# Patient Record
Sex: MALE | Race: WHITE | Employment: FULL TIME | URBAN - METROPOLITAN AREA
[De-identification: names, ages, dates, MRNs, and addresses within clinical notes are randomized per-mention and may not be internally consistent; named-entity substitution may affect disease eponyms.]

---

## 2017-07-25 PROBLEM — E66.9 OBESITY (BMI 30.0-34.9): Status: ACTIVE | Noted: 2017-07-25

## 2017-07-25 PROBLEM — Z72.0 TOBACCO USE: Status: ACTIVE | Noted: 2017-07-25

## 2017-07-25 PROBLEM — G89.29 CHRONIC RIGHT SHOULDER PAIN: Status: ACTIVE | Noted: 2017-07-25

## 2017-07-25 PROBLEM — L82.1 SK (SEBORRHEIC KERATOSIS): Status: ACTIVE | Noted: 2017-07-25

## 2017-07-25 PROBLEM — M25.511 CHRONIC RIGHT SHOULDER PAIN: Status: ACTIVE | Noted: 2017-07-25

## 2018-07-21 PROBLEM — E29.1 MALE HYPOGONADISM: Status: ACTIVE | Noted: 2018-07-21

## 2018-07-23 PROBLEM — R79.82 ELEVATED C-REACTIVE PROTEIN (CRP): Status: ACTIVE | Noted: 2018-07-23

## 2021-05-25 PROBLEM — Z13.220 SCREENING FOR LIPID DISORDERS: Chronic | Status: ACTIVE | Noted: 2021-05-25

## 2021-05-25 PROBLEM — Z00.00 WELL ADULT EXAM: Status: ACTIVE | Noted: 2021-05-25

## 2021-05-25 PROBLEM — R06.83 LOUD SNORING: Status: ACTIVE | Noted: 2021-05-25

## 2021-05-25 PROBLEM — G89.29 CHRONIC LEFT-SIDED LOW BACK PAIN WITH LEFT-SIDED SCIATICA: Status: ACTIVE | Noted: 2021-05-25

## 2021-05-25 PROBLEM — Z13.1 SCREENING FOR DIABETES MELLITUS: Chronic | Status: ACTIVE | Noted: 2021-05-25

## 2021-05-25 PROBLEM — Z00.00 WELL ADULT EXAM: Chronic | Status: ACTIVE | Noted: 2021-05-25

## 2021-05-25 PROBLEM — Z13.1 SCREENING FOR DIABETES MELLITUS: Status: ACTIVE | Noted: 2021-05-25

## 2021-05-25 PROBLEM — G89.29 CHRONIC PAIN OF LEFT KNEE: Status: ACTIVE | Noted: 2021-05-25

## 2021-05-25 PROBLEM — M25.552 CHRONIC LEFT HIP PAIN: Status: ACTIVE | Noted: 2021-05-25

## 2021-05-25 PROBLEM — Z02.89 ENCOUNTER FOR PHYSICAL EXAMINATION RELATED TO EMPLOYMENT: Status: ACTIVE | Noted: 2021-05-25

## 2021-05-25 PROBLEM — E78.1 HYPERTRIGLYCERIDEMIA: Status: RESOLVED | Noted: 2021-05-25 | Resolved: 2021-05-25

## 2021-05-25 PROBLEM — G89.29 CHRONIC LEFT HIP PAIN: Status: ACTIVE | Noted: 2021-05-25

## 2021-05-25 PROBLEM — M54.42 CHRONIC LEFT-SIDED LOW BACK PAIN WITH LEFT-SIDED SCIATICA: Status: ACTIVE | Noted: 2021-05-25

## 2021-05-25 PROBLEM — E78.1 HYPERTRIGLYCERIDEMIA: Status: ACTIVE | Noted: 2021-05-25

## 2021-05-25 PROBLEM — E66.09 CLASS 1 OBESITY DUE TO EXCESS CALORIES WITHOUT SERIOUS COMORBIDITY WITH BODY MASS INDEX (BMI) OF 33.0 TO 33.9 IN ADULT: Status: ACTIVE | Noted: 2017-07-25

## 2021-05-25 PROBLEM — Z13.220 SCREENING FOR LIPID DISORDERS: Status: ACTIVE | Noted: 2021-05-25

## 2021-05-25 PROBLEM — M25.562 CHRONIC PAIN OF LEFT KNEE: Status: ACTIVE | Noted: 2021-05-25

## 2021-05-28 ENCOUNTER — HOSPITAL ENCOUNTER (OUTPATIENT)
Dept: GENERAL RADIOLOGY | Age: 51
Discharge: HOME | End: 2021-05-28
Payer: COMMERCIAL

## 2021-05-28 DIAGNOSIS — G89.29 CHRONIC LEFT HIP PAIN: ICD-10-CM

## 2021-05-28 DIAGNOSIS — G89.29 CHRONIC PAIN OF LEFT KNEE: ICD-10-CM

## 2021-05-28 DIAGNOSIS — M54.42 CHRONIC LEFT-SIDED LOW BACK PAIN WITH LEFT-SIDED SCIATICA: ICD-10-CM

## 2021-05-28 DIAGNOSIS — G89.29 CHRONIC LEFT-SIDED LOW BACK PAIN WITH LEFT-SIDED SCIATICA: ICD-10-CM

## 2021-05-28 DIAGNOSIS — M25.552 CHRONIC LEFT HIP PAIN: ICD-10-CM

## 2021-05-28 DIAGNOSIS — M25.562 CHRONIC PAIN OF LEFT KNEE: ICD-10-CM

## 2021-05-28 PROCEDURE — 73502 X-RAY EXAM HIP UNI 2-3 VIEWS: CPT

## 2021-05-28 PROCEDURE — 72100 X-RAY EXAM L-S SPINE 2/3 VWS: CPT

## 2021-05-28 PROCEDURE — 73562 X-RAY EXAM OF KNEE 3: CPT

## 2021-06-09 PROBLEM — Z51.81 ENCOUNTER FOR MONITORING TESTOSTERONE REPLACEMENT THERAPY: Status: ACTIVE | Noted: 2021-06-09

## 2021-06-09 PROBLEM — Z79.890 ENCOUNTER FOR MONITORING TESTOSTERONE REPLACEMENT THERAPY: Status: ACTIVE | Noted: 2021-06-09

## 2021-06-09 PROBLEM — R68.82 DIMINISHED LIBIDO: Status: ACTIVE | Noted: 2021-06-09

## 2021-06-09 PROBLEM — R53.83 LACK OF ENERGY: Status: ACTIVE | Noted: 2021-06-09

## 2021-06-09 PROBLEM — Z79.899 HIGH RISK MEDICATION USE: Status: ACTIVE | Noted: 2021-06-09

## 2021-06-20 PROBLEM — Z79.890 LONG-TERM CURRENT USE OF TESTOSTERONE REPLACEMENT THERAPY: Status: ACTIVE | Noted: 2021-06-20

## 2021-07-27 ENCOUNTER — HOSPITAL ENCOUNTER (OUTPATIENT)
Dept: SURGERY | Age: 51
Discharge: HOME | End: 2021-07-27

## 2021-07-27 VITALS — HEIGHT: 65 IN | WEIGHT: 194 LBS | BODY MASS INDEX: 32.32 KG/M2

## 2021-07-27 NOTE — PERIOP NOTES
Patient verified name, , and procedure. Type: 1a; abbreviated assessment per anesthesia guidelines  Labs per surgeon: none. Labs per anesthesia: none. Instructed pt that they will be notified via the hospital for time of arrival to GI lab. If no call by 3 pm the day before call [phone]. Follow diet and prep instructions per Dr Dilcia Contreras instructions as follows: You will be on a clear liquid diet the day before your procedure and you may have any of the following clear liquids:   Water.  Strained fruit juices, without pulp, including apple, orange, white grape, or white cranberry.  Limeade or lemonade.  Coffee or tea (do not use any non-dairy creamer.)   Chicken broth.  Gelatin desserts, without added fruit or toppings (no red or purple gelatin.)  You should NOT:   Do NOT drink any milk products or use any milk products in coffee or tea.  Do NOT drink anything with red or purple dye.  Do NOT drink any alcoholic beverages. NPO after midnight        Bath or shower the night before and the am of surgery with non-moisturizing soap. No lotions, oils, powders, cologne on skin. No make up, eye make up or jewelry. Wear loose fitting comfortable, clean clothing. Must have adult present in building the entire time and MUST bring someone with you or arrange for someone to drive you home after the test.      You may take medications up to 3 hours prior to your procedure with sips of water only. Medications to take: none. Patient to hold: none. The following discharge instructions reviewed with patient: medication given during procedure may cause drowsiness for several hours, therefore, do not drive or operate machinery for remainder of the day, no alcohol on the day of your procedure, resume regular diet and activity unless otherwise directed, you may experience abdominal distention for several hours that is relieved by the passage of gas.  Contact your physician if you have any of the following: fever or chills, severe abdominal pain or excessive amount of bleeding or a large amount when having a bowel movement.  Occasional specks of blood with bowel movement would not be unusual.

## 2021-08-02 ENCOUNTER — ANESTHESIA EVENT (OUTPATIENT)
Dept: ENDOSCOPY | Age: 51
End: 2021-08-02
Payer: COMMERCIAL

## 2021-08-03 ENCOUNTER — HOSPITAL ENCOUNTER (OUTPATIENT)
Age: 51
Setting detail: OUTPATIENT SURGERY
Discharge: HOME | End: 2021-08-03
Attending: SURGERY | Admitting: SURGERY
Payer: COMMERCIAL

## 2021-08-03 ENCOUNTER — ANESTHESIA (OUTPATIENT)
Dept: ENDOSCOPY | Age: 51
End: 2021-08-03
Payer: COMMERCIAL

## 2021-08-03 VITALS
HEART RATE: 66 BPM | TEMPERATURE: 96.8 F | OXYGEN SATURATION: 97 % | SYSTOLIC BLOOD PRESSURE: 123 MMHG | BODY MASS INDEX: 32.45 KG/M2 | DIASTOLIC BLOOD PRESSURE: 74 MMHG | RESPIRATION RATE: 16 BRPM | WEIGHT: 195 LBS

## 2021-08-03 DIAGNOSIS — Z80.0 FAMILY HISTORY OF COLON CANCER: ICD-10-CM

## 2021-08-03 PROCEDURE — 76060000031 HC ANESTHESIA FIRST 0.5 HR: Performed by: SURGERY

## 2021-08-03 PROCEDURE — 74011250636 HC RX REV CODE- 250/636: Performed by: NURSE ANESTHETIST, CERTIFIED REGISTERED

## 2021-08-03 PROCEDURE — 76040000025: Performed by: SURGERY

## 2021-08-03 PROCEDURE — 74011000250 HC RX REV CODE- 250: Performed by: NURSE ANESTHETIST, CERTIFIED REGISTERED

## 2021-08-03 PROCEDURE — 45378 DIAGNOSTIC COLONOSCOPY: CPT | Performed by: SURGERY

## 2021-08-03 PROCEDURE — 74011000250 HC RX REV CODE- 250: Performed by: ANESTHESIOLOGY

## 2021-08-03 PROCEDURE — 2709999900 HC NON-CHARGEABLE SUPPLY: Performed by: SURGERY

## 2021-08-03 PROCEDURE — 74011250636 HC RX REV CODE- 250/636: Performed by: ANESTHESIOLOGY

## 2021-08-03 RX ORDER — OXYCODONE HYDROCHLORIDE 5 MG/1
10 TABLET ORAL
Status: DISCONTINUED | OUTPATIENT
Start: 2021-08-03 | End: 2021-08-03 | Stop reason: HOSPADM

## 2021-08-03 RX ORDER — LIDOCAINE HYDROCHLORIDE 20 MG/ML
INJECTION, SOLUTION EPIDURAL; INFILTRATION; INTRACAUDAL; PERINEURAL AS NEEDED
Status: DISCONTINUED | OUTPATIENT
Start: 2021-08-03 | End: 2021-08-03 | Stop reason: HOSPADM

## 2021-08-03 RX ORDER — MIDAZOLAM HYDROCHLORIDE 1 MG/ML
2 INJECTION, SOLUTION INTRAMUSCULAR; INTRAVENOUS
Status: DISCONTINUED | OUTPATIENT
Start: 2021-08-03 | End: 2021-08-03 | Stop reason: HOSPADM

## 2021-08-03 RX ORDER — PROPOFOL 10 MG/ML
INJECTION, EMULSION INTRAVENOUS AS NEEDED
Status: DISCONTINUED | OUTPATIENT
Start: 2021-08-03 | End: 2021-08-03 | Stop reason: HOSPADM

## 2021-08-03 RX ORDER — ONDANSETRON 2 MG/ML
4 INJECTION INTRAMUSCULAR; INTRAVENOUS ONCE
Status: DISCONTINUED | OUTPATIENT
Start: 2021-08-03 | End: 2021-08-03 | Stop reason: HOSPADM

## 2021-08-03 RX ORDER — SODIUM CHLORIDE, SODIUM LACTATE, POTASSIUM CHLORIDE, CALCIUM CHLORIDE 600; 310; 30; 20 MG/100ML; MG/100ML; MG/100ML; MG/100ML
100 INJECTION, SOLUTION INTRAVENOUS CONTINUOUS
Status: DISCONTINUED | OUTPATIENT
Start: 2021-08-03 | End: 2021-08-03 | Stop reason: HOSPADM

## 2021-08-03 RX ORDER — ALBUTEROL SULFATE 0.83 MG/ML
2.5 SOLUTION RESPIRATORY (INHALATION) AS NEEDED
Status: DISCONTINUED | OUTPATIENT
Start: 2021-08-03 | End: 2021-08-03 | Stop reason: HOSPADM

## 2021-08-03 RX ORDER — DIPHENHYDRAMINE HYDROCHLORIDE 50 MG/ML
12.5 INJECTION, SOLUTION INTRAMUSCULAR; INTRAVENOUS
Status: DISCONTINUED | OUTPATIENT
Start: 2021-08-03 | End: 2021-08-03 | Stop reason: HOSPADM

## 2021-08-03 RX ORDER — SODIUM CHLORIDE, SODIUM LACTATE, POTASSIUM CHLORIDE, CALCIUM CHLORIDE 600; 310; 30; 20 MG/100ML; MG/100ML; MG/100ML; MG/100ML
INJECTION, SOLUTION INTRAVENOUS
Status: DISCONTINUED | OUTPATIENT
Start: 2021-08-03 | End: 2021-08-03 | Stop reason: HOSPADM

## 2021-08-03 RX ORDER — OXYCODONE HYDROCHLORIDE 5 MG/1
5 TABLET ORAL
Status: DISCONTINUED | OUTPATIENT
Start: 2021-08-03 | End: 2021-08-03 | Stop reason: HOSPADM

## 2021-08-03 RX ORDER — MIDAZOLAM HYDROCHLORIDE 1 MG/ML
2 INJECTION, SOLUTION INTRAMUSCULAR; INTRAVENOUS ONCE
Status: DISCONTINUED | OUTPATIENT
Start: 2021-08-03 | End: 2021-08-03 | Stop reason: HOSPADM

## 2021-08-03 RX ORDER — NALOXONE HYDROCHLORIDE 0.4 MG/ML
0.1 INJECTION, SOLUTION INTRAMUSCULAR; INTRAVENOUS; SUBCUTANEOUS AS NEEDED
Status: DISCONTINUED | OUTPATIENT
Start: 2021-08-03 | End: 2021-08-03 | Stop reason: HOSPADM

## 2021-08-03 RX ORDER — LIDOCAINE HYDROCHLORIDE 10 MG/ML
0.1 INJECTION INFILTRATION; PERINEURAL AS NEEDED
Status: DISCONTINUED | OUTPATIENT
Start: 2021-08-03 | End: 2021-08-03 | Stop reason: HOSPADM

## 2021-08-03 RX ORDER — FENTANYL CITRATE 50 UG/ML
100 INJECTION, SOLUTION INTRAMUSCULAR; INTRAVENOUS ONCE
Status: DISCONTINUED | OUTPATIENT
Start: 2021-08-03 | End: 2021-08-03 | Stop reason: HOSPADM

## 2021-08-03 RX ORDER — HYDROMORPHONE HYDROCHLORIDE 2 MG/ML
0.5 INJECTION, SOLUTION INTRAMUSCULAR; INTRAVENOUS; SUBCUTANEOUS
Status: DISCONTINUED | OUTPATIENT
Start: 2021-08-03 | End: 2021-08-03 | Stop reason: HOSPADM

## 2021-08-03 RX ADMIN — SODIUM CHLORIDE, SODIUM LACTATE, POTASSIUM CHLORIDE, AND CALCIUM CHLORIDE 100 ML/HR: 600; 310; 30; 20 INJECTION, SOLUTION INTRAVENOUS at 08:40

## 2021-08-03 RX ADMIN — SODIUM CHLORIDE, SODIUM LACTATE, POTASSIUM CHLORIDE, AND CALCIUM CHLORIDE: 600; 310; 30; 20 INJECTION, SOLUTION INTRAVENOUS at 10:11

## 2021-08-03 RX ADMIN — LIDOCAINE HYDROCHLORIDE 30 MG: 20 INJECTION, SOLUTION EPIDURAL; INFILTRATION; INTRACAUDAL; PERINEURAL at 10:17

## 2021-08-03 RX ADMIN — PROPOFOL 50 MG: 10 INJECTION, EMULSION INTRAVENOUS at 10:18

## 2021-08-03 RX ADMIN — PROPOFOL 300 MCG/KG/MIN: 10 INJECTION, EMULSION INTRAVENOUS at 10:19

## 2021-08-03 RX ADMIN — LIDOCAINE HYDROCHLORIDE 0.1 ML: 10 INJECTION, SOLUTION INFILTRATION; PERINEURAL at 08:41

## 2021-08-03 NOTE — PROCEDURES
Procedure in Detail:  Informed consent was obtained for the procedure. The patient was placed in the left lateral decubitus position and sedation was induced by anesthesia. The scope was inserted into the rectum and advanced under direct vision to the cecum, which was identified by the ileocecal valve and appendiceal orifice. The quality of the colonic preparation was excellent. A careful inspection was made as the colonoscope was withdrawn, including a retroflexed view of the rectum; findings and interventions are described below. Appropriate photodocumentation was obtained. Findings:   ANUS: Anal exam reveals no masses or hemorrhoids, sphincter tone is normal.   RECTUM: Rectal exam reveals no masses or hemorrhoids. SIGMOID COLON: The mucosa is normal with good vascular pattern and without ulcers, diverticula, and polyps. DESCENDING COLON: The mucosa is normal with good vascular pattern and without ulcers, diverticula, and polyps. SPLENIC FLEXURE: The splenic flexure is normal.   TRANSVERSE COLON: The mucosa is normal with good vascular pattern and without ulcers, diverticula, and polyps. HEPATIC FLEXURE: The hepatic flexure is normal.   ASCENDING COLON: The mucosa is normal with good vascular pattern and without ulcers, diverticula, and polyps. CECUM: The appendiceal orifice appears normal. The ileocecal valve appears normal.   TERMINAL ILEUM: The terminal ileum was not entered. Specimens: No specimens were collected. Complications: None; patient tolerated the procedure well. \    EBL - minimal    Recommendations:   - Repeat colonoscopy in 5 years.      Signed By: Mary Maier DO                        August 3, 2021

## 2021-08-03 NOTE — DISCHARGE INSTRUCTIONS
Instructions following colonoscopy:    NEXT COLONOSCOPY IN 5 YEARS    ACTIVITY:   Resume usual, basic activities around the house today.  You may be light-headed or sleepy from anesthesia, so be careful going up and down stairs.  Avoid driving, operating machinery, making important business decisions, or signing documents for 24 hours.  No alcohol for 24 hours.  If you have not urinated 8 hours after discharge, please call MD.    DIET:   No restriction. Greasy and spicy foods may cause upset after sedation. Please note, some people may have nausea or cramps after this procedure which can result in an upset stomach after eating.  Many people have loose stools or diarrhea immediately after colonoscopy. It is also not uncommon to not have a bowel movement for 2-3 days.  Air may have been put into your belly for the procedure. This may cause abdominal distention and gas pain. It is important to lay on your left side or ambulate to expel gas to relieve discomfort. PAIN:   Some cramping or gas pain is normal after colonoscopy. However, if you experience worsening pain over the course of the day, or pain with associated fever please call the office immediately      [address] IF:   You have a temperature higher than 101.5° Fahrenheit for more than 6 hours.  You have severe nausea or vomiting not relieved by medication; or diarrhea. Continue home medications as previously prescribed. MEDICATION INTERACTION:  During your procedure you potentially received a medication or medications which may reduce the effectiveness of oral contraceptives. Please consider other forms of contraception for 1 month following your procedure if you are currently using oral contraceptives as your primary form of birth control.  In addition to this, we recommend continuing your oral contraceptive as prescribed, unless otherwise instructed by your physician, during this time    After general anesthesia or intravenous sedation, for 24 hours or while taking prescription Narcotics:  · Limit your activities  · A responsible adult needs to be with you for the next 24 hours  · Do not drive and operate hazardous machinery  · Do not make important personal or business decisions  · Do not drink alcoholic beverages  · If you have not urinated within 8 hours after discharge, and you are experiencing discomfort from urinary retention, please go to the nearest ED. · If you have sleep apnea and have a CPAP machine, please use it for all naps and sleeping. · Please use caution when taking narcotics and any of your home medications that may cause drowsiness. *  Please give a list of your current medications to your Primary Care Provider. *  Please update this list whenever your medications are discontinued, doses are      changed, or new medications (including over-the-counter products) are added. *  Please carry medication information at all times in case of emergency situations. These are general instructions for a healthy lifestyle:  No smoking/ No tobacco products/ Avoid exposure to second hand smoke  Surgeon General's Warning:  Quitting smoking now greatly reduces serious risk to your health. Obesity, smoking, and sedentary lifestyle greatly increases your risk for illness  A healthy diet, regular physical exercise & weight monitoring are important for maintaining a healthy lifestyle    You may be retaining fluid if you have a history of heart failure or if you experience any of the following symptoms:  Weight gain of 3 pounds or more overnight or 5 pounds in a week, increased swelling in our hands or feet or shortness of breath while lying flat in bed. Please call your doctor as soon as you notice any of these symptoms; do not wait until your next office visit.

## 2021-08-03 NOTE — ANESTHESIA POSTPROCEDURE EVALUATION
Procedure(s):  COLONOSCOPY/BMI 33.     total IV anesthesia    Anesthesia Post Evaluation      Multimodal analgesia: multimodal analgesia used between 6 hours prior to anesthesia start to PACU discharge  Patient location during evaluation: PACU  Patient participation: complete - patient participated  Level of consciousness: awake and awake and alert  Pain management: adequate  Airway patency: patent  Anesthetic complications: no  Cardiovascular status: acceptable  Respiratory status: acceptable  Hydration status: acceptable  Post anesthesia nausea and vomiting:  controlled      INITIAL Post-op Vital signs:   Vitals Value Taken Time   /58 08/03/21 1050   Temp 36 °C (96.8 °F) 08/03/21 1040   Pulse 65 08/03/21 1050   Resp 16 08/03/21 1050   SpO2 98 % 08/03/21 1050

## 2021-08-03 NOTE — ANESTHESIA PREPROCEDURE EVALUATION
Relevant Problems   ENDOCRINE   (+) Class 1 obesity due to excess calories without serious comorbidity with body mass index (BMI) of 33.0 to 33.9 in adult       Anesthetic History   No history of anesthetic complications            Review of Systems / Medical History  Patient summary reviewed, nursing notes reviewed and pertinent labs reviewed    Pulmonary  Within defined limits                 Neuro/Psych   Within defined limits           Cardiovascular  Within defined limits                Exercise tolerance: >4 METS     GI/Hepatic/Renal  Within defined limits              Endo/Other             Other Findings              Physical Exam    Airway  Mallampati: II  TM Distance: 4 - 6 cm  Neck ROM: normal range of motion   Mouth opening: Normal     Cardiovascular  Regular rate and rhythm,  S1 and S2 normal,  no murmur, click, rub, or gallop             Dental  No notable dental hx       Pulmonary  Breath sounds clear to auscultation               Abdominal         Other Findings            Anesthetic Plan    ASA: 2  Anesthesia type: total IV anesthesia          Induction: Intravenous  Anesthetic plan and risks discussed with: Patient Bladder non-tender and non-distended. Urine clear yellow.

## 2021-08-03 NOTE — H&P
Lisa Valdovinos    8/3/2021    Date of Admission:  8/3/2021      Subjective:     Patient is a 44 y.o.  male presents for screening colonoscopy. The patient reports no problems. The patient has family history of colon cancer. The patient has never had a colonoscopy in the past. Otherwise there is no reported rectal bleeding or melena. No changes in appetite or unusual wt loss. No abdominal pain or bloating. No reported changes in bowel habits. Patient Active Problem List    Diagnosis Date Noted    Family history of colon cancer 2021    Long-term current use of testosterone replacement therapy 2021    Diminished libido 2021    Lack of energy 2021    Encounter for monitoring testosterone replacement therapy 2021    High risk medication use 2021    Well adult exam 2021    Encounter for physical examination related to employment 2021    Chronic left-sided low back pain with left-sided sciatica 2021    Chronic left hip pain 2021    Chronic pain of left knee 2021    Loud snoring 2021    Screening for diabetes mellitus 2021    Screening for lipid disorders 2021    Elevated C-reactive protein (CRP) 2018    Male hypogonadism 2018    Chews tobacco regularly 2017    Class 1 obesity due to excess calories without serious comorbidity with body mass index (BMI) of 33.0 to 33.9 in adult 2017    Chronic right shoulder pain 2017     Past Medical History:   Diagnosis Date    Chronic right shoulder pain     Hypertriglyceridemia 2021    no meds     Obesity (BMI 30.0-34. 9)       Past Surgical History:   Procedure Laterality Date    HX WISDOM TEETH EXTRACTION        Prior to Admission Medications   Prescriptions Last Dose Informant Patient Reported? Taking?    testosterone 12.5 mg/ 1.25 gram (1 %) glpm 7/3/2021 at Unknown time  No Yes   Si Actuation(s) by TransDERmal route daily. Max Daily Amount: 2 Actuation(s). Apply to skin of anterior shoulders/upper chest, 1 application each side. Facility-Administered Medications: None     No Known Allergies   Social History     Tobacco Use    Smoking status: Never Smoker    Smokeless tobacco: Current User    Tobacco comment: dip   Substance Use Topics    Alcohol use: Yes     Comment: 6-7 drinks per week       Social History     Social History Narrative    Not on file     Family History   Problem Relation Age of Onset    COPD Mother     Diabetes Father     Cancer Father         prostate        Current Facility-Administered Medications   Medication Dose Route Frequency    lidocaine (XYLOCAINE) 10 mg/mL (1 %) injection 0.1 mL  0.1 mL SubCUTAneous PRN    lactated Ringers infusion  100 mL/hr IntraVENous CONTINUOUS    fentaNYL citrate (PF) injection 100 mcg  100 mcg IntraVENous ONCE    midazolam (VERSED) injection 2 mg  2 mg IntraVENous ONCE PRN    midazolam (VERSED) injection 2 mg  2 mg IntraVENous ONCE       Review of Systems  A comprehensive review of systems was negative except for that written in the HPI.     Objective:     Vitals:    08/03/21 0813   BP: (!) 153/92   Pulse: (!) 58   Resp: 18   Temp: 97.2 °F (36.2 °C)   SpO2: 100%   Weight: 195 lb (88.5 kg)     PHYSICAL EXAM   Physical Examination: General appearance - alert, well appearing, and in no distress  Mental status - alert, oriented to person, place, and time  Eyes - pupils equal and reactive, extraocular eye movements intact  Nose - normal and patent, no erythema, discharge or polyps  Neck - supple, no significant adenopathy  Chest - clear to auscultation, no wheezes, rales or rhonchi, symmetric air entry  Heart - normal rate, regular rhythm, normal S1, S2, no murmurs, rubs, clicks or gallops  Abdomen - soft, nontender, nondistended, no masses or organomegaly  Neurological - alert, oriented, normal speech, no focal findings or movement disorder noted  Musculoskeletal - no joint tenderness, deformity or swelling  Extremities - peripheral pulses normal, no pedal edema, no clubbing or cyanosis  Skin - normal coloration and turgor, no rashes, no suspicious skin lesions noted      No results for input(s): WBC, HGB, HCT, PLT, HGBEXT, HCTEXT, PLTEXT in the last 72 hours. No results for input(s): NA, K, CL, GLU, CO2, BUN, CREA, MG, PHOS, TROIQ, INR, BNPP, INREXT in the last 72 hours. No lab exists for component: TROIP  No results for input(s): PH, PCO2, PO2, HCO3 in the last 72 hours.     Assessment:     Hospital Problems  Date Reviewed: 7/30/2021        Codes Class Noted POA    * (Principal) Family history of colon cancer ICD-10-CM: Z80.0  ICD-9-CM: V16.0  8/3/2021 Unknown            Plan:   Screening colonoscopy        Lopez Davila,

## 2021-12-13 ENCOUNTER — HOSPITAL ENCOUNTER (OUTPATIENT)
Dept: SURGERY | Age: 51
Discharge: HOME | End: 2021-12-13
Payer: COMMERCIAL

## 2021-12-13 ENCOUNTER — HOSPITAL ENCOUNTER (OUTPATIENT)
Dept: REHABILITATION | Age: 51
Discharge: HOME | End: 2021-12-13
Payer: COMMERCIAL

## 2021-12-13 VITALS
WEIGHT: 195 LBS | TEMPERATURE: 98.8 F | BODY MASS INDEX: 32.49 KG/M2 | HEIGHT: 65 IN | RESPIRATION RATE: 18 BRPM | SYSTOLIC BLOOD PRESSURE: 145 MMHG | OXYGEN SATURATION: 98 % | DIASTOLIC BLOOD PRESSURE: 85 MMHG | HEART RATE: 64 BPM

## 2021-12-13 DIAGNOSIS — R06.83 SNORING: Primary | ICD-10-CM

## 2021-12-13 LAB
ALBUMIN SERPL-MCNC: 3.7 G/DL (ref 3.5–5)
ANION GAP SERPL CALC-SCNC: 6 MMOL/L (ref 7–16)
APTT PPP: 28 SEC (ref 24.1–35.1)
ATRIAL RATE: 66 BPM
BACTERIA SPEC CULT: ABNORMAL
BASOPHILS # BLD: 0 K/UL (ref 0–0.2)
BASOPHILS NFR BLD: 1 % (ref 0–2)
BUN SERPL-MCNC: 8 MG/DL (ref 6–23)
CALCIUM SERPL-MCNC: 8.9 MG/DL (ref 8.3–10.4)
CALCULATED P AXIS, ECG09: 16 DEGREES
CALCULATED R AXIS, ECG10: 74 DEGREES
CALCULATED T AXIS, ECG11: 34 DEGREES
CHLORIDE SERPL-SCNC: 105 MMOL/L (ref 98–107)
CO2 SERPL-SCNC: 27 MMOL/L (ref 21–32)
CREAT SERPL-MCNC: 0.81 MG/DL (ref 0.8–1.5)
DIAGNOSIS, 93000: NORMAL
DIFFERENTIAL METHOD BLD: ABNORMAL
EOSINOPHIL # BLD: 0.2 K/UL (ref 0–0.8)
EOSINOPHIL NFR BLD: 3 % (ref 0.5–7.8)
ERYTHROCYTE [DISTWIDTH] IN BLOOD BY AUTOMATED COUNT: 11.9 % (ref 11.9–14.6)
EST. AVERAGE GLUCOSE BLD GHB EST-MCNC: 111 MG/DL
GLUCOSE SERPL-MCNC: 104 MG/DL (ref 65–100)
HBA1C MFR BLD: 5.5 % (ref 4.2–6.3)
HCT VFR BLD AUTO: 44 % (ref 41.1–50.3)
HGB BLD-MCNC: 15 G/DL (ref 13.6–17.2)
IMM GRANULOCYTES # BLD AUTO: 0 K/UL (ref 0–0.5)
IMM GRANULOCYTES NFR BLD AUTO: 0 % (ref 0–5)
INR PPP: 1
LYMPHOCYTES # BLD: 1.9 K/UL (ref 0.5–4.6)
LYMPHOCYTES NFR BLD: 31 % (ref 13–44)
MCH RBC QN AUTO: 32.3 PG (ref 26.1–32.9)
MCHC RBC AUTO-ENTMCNC: 34.1 G/DL (ref 31.4–35)
MCV RBC AUTO: 94.8 FL (ref 79.6–97.8)
MONOCYTES # BLD: 0.6 K/UL (ref 0.1–1.3)
MONOCYTES NFR BLD: 10 % (ref 4–12)
NEUTS SEG # BLD: 3.4 K/UL (ref 1.7–8.2)
NEUTS SEG NFR BLD: 55 % (ref 43–78)
NRBC # BLD: 0 K/UL (ref 0–0.2)
P-R INTERVAL, ECG05: 148 MS
PLATELET # BLD AUTO: 326 K/UL (ref 150–450)
PMV BLD AUTO: 9.2 FL (ref 9.4–12.3)
POTASSIUM SERPL-SCNC: 3.7 MMOL/L (ref 3.5–5.1)
PROTHROMBIN TIME: 13.9 SEC (ref 12.6–14.5)
Q-T INTERVAL, ECG07: 420 MS
QRS DURATION, ECG06: 98 MS
QTC CALCULATION (BEZET), ECG08: 440 MS
RBC # BLD AUTO: 4.64 M/UL (ref 4.23–5.6)
SERVICE CMNT-IMP: ABNORMAL
SODIUM SERPL-SCNC: 138 MMOL/L (ref 136–145)
VENTRICULAR RATE, ECG03: 66 BPM
WBC # BLD AUTO: 6.1 K/UL (ref 4.3–11.1)

## 2021-12-13 PROCEDURE — 85610 PROTHROMBIN TIME: CPT

## 2021-12-13 PROCEDURE — 94760 N-INVAS EAR/PLS OXIMETRY 1: CPT

## 2021-12-13 PROCEDURE — 80048 BASIC METABOLIC PNL TOTAL CA: CPT

## 2021-12-13 PROCEDURE — 83036 HEMOGLOBIN GLYCOSYLATED A1C: CPT

## 2021-12-13 PROCEDURE — 80307 DRUG TEST PRSMV CHEM ANLYZR: CPT

## 2021-12-13 PROCEDURE — 93005 ELECTROCARDIOGRAM TRACING: CPT

## 2021-12-13 PROCEDURE — 82040 ASSAY OF SERUM ALBUMIN: CPT

## 2021-12-13 PROCEDURE — 85730 THROMBOPLASTIN TIME PARTIAL: CPT

## 2021-12-13 PROCEDURE — 36415 COLL VENOUS BLD VENIPUNCTURE: CPT

## 2021-12-13 PROCEDURE — 97161 PT EVAL LOW COMPLEX 20 MIN: CPT

## 2021-12-13 PROCEDURE — 85025 COMPLETE CBC W/AUTO DIFF WBC: CPT

## 2021-12-13 PROCEDURE — 87641 MR-STAPH DNA AMP PROBE: CPT

## 2021-12-13 PROCEDURE — 77030027138 HC INCENT SPIROMETER -A

## 2021-12-13 NOTE — PERIOP NOTES
PLEASE CONTINUE TAKING ALL PRESCRIPTION MEDICATIONS UP TO THE DAY OF SURGERY UNLESS OTHERWISE DIRECTED BELOW. DISCONTINUE all vitamins, herbals and supplements 21 days prior to surgery. DISCONTINUE Non-Steriodal Anti-Inflammatory (NSAIDS) such as Advil, Ibuprofen, and Aleve 5 days prior to surgery. Home Medications to HOLD      All vitamins, supplements, and herbals stop 21 days prior to surgery. All NSAIDs such as Advil, Aleve, Ibuprofen, Diclofenac, Naproxen, etc. Stop 5 days prior to surgery. Aspirin and Aspirin products stop 5 days prior to surgery. *IT IS OK TO TAKE TYLENOL*     Home Medications to take  the day of surgery   NONE        Comments   *The day before surgery, 1/4/22, take Acetaminophen (Tylenol) 1000mg in the morning and again at bedtime*   BRING: bottle of soap (Dynahex) and incentive spirometer to the hospital.           Please do not bring home medications with you on the day of surgery unless otherwise directed by your nurse. If you are instructed to bring home medications, please give them to your nurse as they will be administered by the nursing staff. If you have any questions, please call [facility] [phone] or [facility] [phone]. Copy of above instructions given to patient.

## 2021-12-13 NOTE — PROGRESS NOTES
Cristian Oliva  : (17 y.o.) [facility] [place] [address].  [phone]       Physical Therapy Prehab Plan of Treatment and Evaluation Summary:2021    ICD-10: Treatment Diagnosis:   · Pain in left hip (M25.552)  · Stiffness of Left Hip, Not elsewhere classified (M25.652)  · Difficulty in walking, Not elsewhere classified (R26.2)  Precautions/Allergies:   Patient has no known allergies. MEDICAL/REFERRING DIAGNOSIS:  Unilateral primary osteoarthritis, left hip [M16.12]  REFERRING PHYSICIAN: Gely José MD  DATE OF SURGERY: 22    Assessment:   Comments:  Pt. Plans to go home with support from wife and kids. PROBLEM LIST (Impacting functional limitations):  Mr. Jamin Arana presents with the following left lower extremity(s) problems:  1. Strength  2. Range of Motion  3. Home Exercise Program  4. Pain   INTERVENTIONS PLANNED:  1. Home Exercise Program  2. Educational Discussion      TREATMENT PLAN: Effective Dates: 2021 TO 2021. Frequency/Duration: Patient to continue to perform home exercise program at least twice per day up until his surgery. GOALS: (Goals have been discussed and agreed upon with patient.)  Discharge Goals: Time Frame: 1 Day  1. Patient will demonstrate independence with a home exercise program designed to increase strength, range of motion and pain control to minimize functional deficits and optimize patient for total joint replacement. Rehabilitation Potential For Stated Goals: Good  Regarding Cristian Oliva therapy, I certify that the treatment plan above will be carried out by a therapist or under their direction.   Thank you for this referral,  Donnie Caldwell, PT               HISTORY:   Present Symptoms:  Pain Intensity 1:  (6 at worst)  Pain Location 1: Hip   History of Present Injury/Illness (Reason for Referral):  Medical/Referring Diagnosis: Unilateral primary osteoarthritis, left hip [M16.12]   Past Medical History/Comorbidities:   Mr. Tejas Baca  has a past medical history of Chronic right shoulder pain, Hypertriglyceridemia (05/25/2021), Obesity (BMI 30.0-34.9), and Smokeless tobacco use. Mr. Tejas Baca  has a past surgical history that includes hx wisdom teeth extraction and colonoscopy (N/A, 8/3/2021). Social History/Living Environment:   Home Environment: Private residence  # Steps to Enter: 1  Rails to Enter: No  One/Two Story Residence: One story  Living Alone: No  Support Systems: Spouse/Significant Other; Child(tucker)  Patient Expects to be Discharged to[de-identified] [employer]  Current DME Used/Available at Home: None  Tub or Shower Type: Tub/Shower combination    Work/Activity:  [employer]  Dominant Side:  RIGHT  Current Medications:  See Pre-assessment nursing note   Number of Personal Factors/Comorbidities that affect the Plan of Care: 1-2: MODERATE COMPLEXITY   EXAMINATION:   ADLs (Current Functional Status):   Ambulation:  [x] Independent  [] Walk Indoors Only  [] Walk Outdoors  [] Use Assistive Device  [] Use Wheelchair Only Dressing:  [x] [address] from Someone for:  [] Sock/Shoes  [] Pants  [] Everything   Bathing/Showering:   [x] Independent  [] Requires Assistance from Someone  [] [address] Only Household Activities:  [x] Routine house and yard work  [] Light Housework Only  [] None   Observation/Orthostatic Postural Assessment:       ROM/Flexibility:   Gross Assessment: Yes  AROM: Within functional limits (right LE)                LLE Assessment  LLE Assessment (WDL): Exception to WDL (left hip 3/5)  LLE AROM  L Hip Flexion: 100  L Hip ABduction: 20  L Knee Extension: 0          Strength:   Gross Assessment: Yes  Strength:  Within functional limits (right LE)                  Functional Mobility:    Gross Assessment: Yes    Gait Description (WDL): Exceptions to WDL  Stand to Sit: Independent  Sit to Stand: Independent  Distance (ft): 2000 Feet (ft)  Ambulation - Level of Assistance: Independent  Stance: Left decreased          Balance:    Sitting: Intact  Standing: Intact   Body Structures Involved:  1. Bones  2. Joints  3. Muscles  4. Ligaments Body Functions Affected:  1. Movement Related Activities and Participation Affected:  1. Mobility   Number of elements that affect the Plan of Care: 3: MODERATE COMPLEXITY   CLINICAL PRESENTATION:   Presentation: Stable and uncomplicated: LOW COMPLEXITY   CLINICAL DECISION MAKING:   Tool Used: Hip dysfunction and Osteoarthritis Outcome Score for Joint Replacement (HOOS, JR)  Score:  Initial: 6 (Interval: 70.426) 12/13/2021 Most Recent: TBD   Interpretation of Score: The HOOS, JR contains 6 items from the original HOOS survey. Items are coded from 0 to 4, none to extreme respectively. Lisa Bee is scored by summing the raw response (range 0-24) and then converting it to an interval score using the table provided below. The interval score ranges from 0 to 100 where 0 represents total hip disability and 100 represents perfect hip health. Medical Necessity:   · Mr. Stephan Brown is expected to optimize his lower extremity strength and ROM in preparation for joint replacement surgery. Reason for Services/Other Comments:  · Achieve baseline assesment of musculoskeletal system, functional mobility and home environment. , educate in PT HEP in preparation for surgery, educate in hospital plan of care. Use of outcome tool(s) and clinical judgement create a POC that gives a: Clear prediction of patient's progress: LOW COMPLEXITY   TREATMENT:   Treatment/Session Assessment:  Patient was instructed in PT- HEP to increase strength and ROM in LEs. Answered all questions. · Post session pain:  Hip and knee pain  · Compliance with Program/Exercises: compliant most of the time.   Total Treatment Duration:  PT Patient Time In/Time Out  Time In: 1100  Time Out: 10 Sharad Herbert, PT

## 2021-12-13 NOTE — ADVANCED PRACTICE NURSE
Total Joint Surgery Preoperative Chart Review      Patient ID:  Paxton Bruce  [id number]  31 y.o.  1970  Surgeon: Dr. Irene Cordova  Date of Surgery: 1/5/2022  Procedure: Total Left Hip Arthroplasty  Primary Care Physician: Brionna Topete [phone]  Specialty Physician(s):      Subjective:   Paxton Bruce is a 46 y.o. WHITE/NON- male who presents for preoperative evaluation for Total Left Hip arthroplasty. This is a preoperative chart review note based on data collected by the nurse at the surgical Pre-Assessment visit. Past Medical History:   Diagnosis Date    Chronic right shoulder pain     Hypertriglyceridemia 05/25/2021    no meds     Obesity (BMI 30.0-34. 9)     Smokeless tobacco use       Past Surgical History:   Procedure Laterality Date    COLONOSCOPY N/A 8/3/2021    COLONOSCOPY/BMI 35 performed by Agus Astorga DO at [facility] HX WISDOM TEETH EXTRACTION       Family History   Problem Relation Age of Onset    COPD Mother     Diabetes Father     Cancer Father         prostate      Social History     Tobacco Use    Smoking status: Never Smoker    Smokeless tobacco: Current User    Tobacco comment: dip   Substance Use Topics    Alcohol use: Yes     Comment: 2 drinks per day/14 per week        Prior to Admission medications    Not on File     No Known Allergies       Objective:     Physical Exam:   Patient Vitals for the past 24 hrs:   Temp Pulse Resp BP SpO2   12/13/21 1207 98.8 °F (37.1 °C) 64 18 (!) 145/85 98 %       ECG:    EKG Results     Procedure 720 Value Units Date/Time    EKG, 12 LEAD, INITIAL [[id number]] Collected: 12/13/21 1204    Order Status: Completed Updated: 12/13/21 1328     Ventricular Rate 66 BPM      Atrial Rate 66 BPM      P-R Interval 148 ms      QRS Duration 98 ms      Q-T Interval 420 ms      QTC Calculation (Bezet) 440 ms      Calculated P Axis 16 degrees      Calculated R Axis 74 degrees      Calculated T Axis 34 degrees Diagnosis --     Normal sinus rhythm  Normal ECG  No previous ECGs available  Confirmed by [facility]  MD ()RAY ([id number]) on 12/13/2021 1:28:21 PM            Data Review:   Labs:   Recent Results (from the past 24 hour(s))   CBC WITH AUTOMATED DIFF    Collection Time: 12/13/21 11:15 AM   Result Value Ref Range    WBC 6.1 4.3 - 11.1 K/uL    RBC 4.64 4.23 - 5.6 M/uL    HGB 15.0 13.6 - 17.2 g/dL    HCT 44.0 41.1 - 50.3 %    MCV 94.8 79.6 - 97.8 FL    MCH 32.3 26.1 - 32.9 PG    MCHC 34.1 31.4 - 35.0 g/dL    RDW 11.9 11.9 - 14.6 %    PLATELET 927 005 - 456 K/uL    MPV 9.2 (L) 9.4 - 12.3 FL    ABSOLUTE NRBC 0.00 0.0 - 0.2 K/uL    DF AUTOMATED      NEUTROPHILS 55 43 - 78 %    LYMPHOCYTES 31 13 - 44 %    MONOCYTES 10 4.0 - 12.0 %    EOSINOPHILS 3 0.5 - 7.8 %    BASOPHILS 1 0.0 - 2.0 %    IMMATURE GRANULOCYTES 0 0.0 - 5.0 %    ABS. NEUTROPHILS 3.4 1.7 - 8.2 K/UL    ABS. LYMPHOCYTES 1.9 0.5 - 4.6 K/UL    ABS. MONOCYTES 0.6 0.1 - 1.3 K/UL    ABS. EOSINOPHILS 0.2 0.0 - 0.8 K/UL    ABS. BASOPHILS 0.0 0.0 - 0.2 K/UL    ABS. IMM.  GRANS. 0.0 0.0 - 0.5 K/UL   HEMOGLOBIN A1C WITH EAG    Collection Time: 12/13/21 11:15 AM   Result Value Ref Range    Hemoglobin A1c 5.5 4.20 - 6.30 %    Est. average glucose 802 mg/dL   METABOLIC PANEL, BASIC    Collection Time: 12/13/21 11:15 AM   Result Value Ref Range    Sodium 138 136 - 145 mmol/L    Potassium 3.7 3.5 - 5.1 mmol/L    Chloride 105 98 - 107 mmol/L    CO2 27 21 - 32 mmol/L    Anion gap 6 (L) 7 - 16 mmol/L    Glucose 104 (H) 65 - 100 mg/dL    BUN 8 6 - 23 MG/DL    Creatinine 0.81 0.8 - 1.5 MG/DL    GFR est AA >60 >60 ml/min/1.73m2    GFR est non-AA >60 >60 ml/min/1.73m2    Calcium 8.9 8.3 - 10.4 MG/DL   PROTHROMBIN TIME + INR    Collection Time: 12/13/21 11:15 AM   Result Value Ref Range    Prothrombin time 13.9 12.6 - 14.5 sec    INR 1.0     PTT    Collection Time: 12/13/21 11:15 AM   Result Value Ref Range    aPTT 28.0 24.1 - 35.1 SEC   ALBUMIN    Collection Time: 12/13/21 11:15 AM   Result Value Ref Range    Albumin 3.7 3.5 - 5.0 g/dL   EKG, 12 LEAD, INITIAL    Collection Time: 12/13/21 12:04 PM   Result Value Ref Range    Ventricular Rate 66 BPM    Atrial Rate 66 BPM    P-R Interval 148 ms    QRS Duration 98 ms    Q-T Interval 420 ms    QTC Calculation (Bezet) 440 ms    Calculated P Axis 16 degrees    Calculated R Axis 74 degrees    Calculated T Axis 34 degrees    Diagnosis       Normal sinus rhythm  Normal ECG  No previous ECGs available  Confirmed by [facility]  MD ()RAY ([id number]) on 12/13/2021 1:28:21 PM           Problem List:  )  Patient Active Problem List   Diagnosis Code    Chews tobacco regularly Z72.0    Class 1 obesity due to excess calories without serious comorbidity with body mass index (BMI) of 33.0 to 33.9 in adult E66.09, Z68.33    Chronic right shoulder pain M25.511, G89.29    Male hypogonadism E29.1    Elevated C-reactive protein (CRP) R79.82    Well adult exam Z00.00    Encounter for physical examination related to employment Z02.89    Chronic left-sided low back pain with left-sided sciatica M54.42, G89.29    Chronic left hip pain M25.552, G89.29    Chronic pain of left knee M25.562, G89.29    Loud snoring R06.83    Screening for diabetes mellitus Z13.1    Screening for lipid disorders Z13.220    Diminished libido R68.82    Lack of energy R53.83    Encounter for monitoring testosterone replacement therapy Z51.81, Z79.890    High risk medication use Z79.899    Long-term current use of testosterone replacement therapy Z79.890    Family history of colon cancer Z80.0       Total Joint Surgery Pre-Assessment Recommendations:           Patient reports the symptoms of snoring, fatigue, observed apnea and /or excessive daytime sleepiness. Will refer patient for HST based on above assessment. Recommend continuous saturation monitoring hours of sleep, during hospitalization. Patient is excellent candidate for SDD.  I discussed with patient and he is acceptable to SDD. Will forward to Dr Julian Hermosillo.    Signed By: NANDO Levy    December 13, 2021

## 2021-12-13 NOTE — PERIOP NOTES
The below lab results are within anesthesia limits. Results routed via [facility] to patient's PCP per surgeon's request.     Recent Results (from the past 12 hour(s))   CBC WITH AUTOMATED DIFF    Collection Time: 12/13/21 11:15 AM   Result Value Ref Range    WBC 6.1 4.3 - 11.1 K/uL    RBC 4.64 4.23 - 5.6 M/uL    HGB 15.0 13.6 - 17.2 g/dL    HCT 44.0 41.1 - 50.3 %    MCV 94.8 79.6 - 97.8 FL    MCH 32.3 26.1 - 32.9 PG    MCHC 34.1 31.4 - 35.0 g/dL    RDW 11.9 11.9 - 14.6 %    PLATELET 630 757 - 272 K/uL    MPV 9.2 (L) 9.4 - 12.3 FL    ABSOLUTE NRBC 0.00 0.0 - 0.2 K/uL    DF AUTOMATED      NEUTROPHILS 55 43 - 78 %    LYMPHOCYTES 31 13 - 44 %    MONOCYTES 10 4.0 - 12.0 %    EOSINOPHILS 3 0.5 - 7.8 %    BASOPHILS 1 0.0 - 2.0 %    IMMATURE GRANULOCYTES 0 0.0 - 5.0 %    ABS. NEUTROPHILS 3.4 1.7 - 8.2 K/UL    ABS. LYMPHOCYTES 1.9 0.5 - 4.6 K/UL    ABS. MONOCYTES 0.6 0.1 - 1.3 K/UL    ABS. EOSINOPHILS 0.2 0.0 - 0.8 K/UL    ABS. BASOPHILS 0.0 0.0 - 0.2 K/UL    ABS. IMM.  GRANS. 0.0 0.0 - 0.5 K/UL   HEMOGLOBIN A1C WITH EAG    Collection Time: 12/13/21 11:15 AM   Result Value Ref Range    Hemoglobin A1c 5.5 4.20 - 6.30 %    Est. average glucose 701 mg/dL   METABOLIC PANEL, BASIC    Collection Time: 12/13/21 11:15 AM   Result Value Ref Range    Sodium 138 136 - 145 mmol/L    Potassium 3.7 3.5 - 5.1 mmol/L    Chloride 105 98 - 107 mmol/L    CO2 27 21 - 32 mmol/L    Anion gap 6 (L) 7 - 16 mmol/L    Glucose 104 (H) 65 - 100 mg/dL    BUN 8 6 - 23 MG/DL    Creatinine 0.81 0.8 - 1.5 MG/DL    GFR est AA >60 >60 ml/min/1.73m2    GFR est non-AA >60 >60 ml/min/1.73m2    Calcium 8.9 8.3 - 10.4 MG/DL   PROTHROMBIN TIME + INR    Collection Time: 12/13/21 11:15 AM   Result Value Ref Range    Prothrombin time 13.9 12.6 - 14.5 sec    INR 1.0     PTT    Collection Time: 12/13/21 11:15 AM   Result Value Ref Range    aPTT 28.0 24.1 - 35.1 SEC   ALBUMIN    Collection Time: 12/13/21 11:15 AM   Result Value Ref Range    Albumin 3.7 3.5 - 5.0 g/dL

## 2021-12-13 NOTE — PERIOP NOTES
Patient verified name and . Order for consent found in EHR and matches case posting; patient verified. Type 3 surgery, joint assessment complete. Labs per surgeon: CBC,BMP, PT/PTT, HgbA1c, Albumin, Nicotine/Cotinine; results pending. T&S DOS and POC glucose DOS; orders signed and held in EHR. Labs per anesthesia protocol: no additional  EKG: Completed today; results within anesthesia limits. Patient COVID test date 1/3/22 at . Thelma Rhodes [address]; Patient aware. The testing center [facility], [place]  and telephone number [phone] provided to the patient if not appointment found. MRSA/MSSA swab collected; pharmacy to review and dose antibiotic as appropriate. Hospital approved surgical skin cleanser and instructions to return bottle on DOS given per hospital policy. Patient provided with handouts including Guide to Surgery, Pain Management, Hand Hygiene, Blood Transfusion Education, and [place] [facility] Brochure. Patient answered medical/surgical history questions at their best of ability. All prior to admission medications documented in [facility]. Original medication prescription bottle NOT visualized during patient appointment. Patient instructed to hold all vitamins, supplements, herbals 3 weeks prior to surgery and NSAIDS/ASA 5 days prior to surgery. Patient teach back successful and patient demonstrates knowledge of instruction.

## 2021-12-13 NOTE — PROGRESS NOTES
12/13/21 1030   Oxygen Therapy   O2 Sat (%) 98 %   Pulse via Oximetry 72 beats per minute   O2 Device None (Room air)   Pre-Treatment   Breath Sounds Bilateral Clear   Pt's symptoms include:    Snoring  Tiredness- excessive daytime sleepiness  Esophageal disorder  Neck size      38        cm  Modified Louie stage 3-4  SACS Score 4  STOP BANG 4  [place] Sleepiness scale 11  Height    5  ' 5   \"   Weight  195   lbs  BMI 32.45    Sleepiness Scale:     Sitting and reading 2    Watching TV 2    Sitting inactive in a public place 0    As a passenger in a car for an hour without a break 1    Lying down to rest in the afternoon when circumstances Permits 3    Sitting and talking to someone 1    Sitting quietly after lunch without alcohol 2    In a car, while stopped for a few minutes 0    Total :  11      Refer patient for sleep study based on above assessment. Initial respiratory Assessment completed with pt. Pt was interviewed and evaluated in Joint camp prior to surgery. Patient ID:  Pollo Davis  [id number]  73 y.o.  1970  Surgeon: Dr. Marlys Sykes  Date of Surgery: 1/5/2022  Procedure: Total Left Hip Arthroplasty  Primary Care Physician: Kaya Brooke [place] [phone]  Specialists:     Pt taught proper COUGH technique  DIAPHRAGMATIC BREATHING EXERCISE INSTRUCTIONS GIVEN    History of smoking:   DENIES  Pt does chew tobacco                 Quit date:         Secondhand smoke:mother    Past procedures with Oxygen desaturation or delayed awakening:DENIES    Past Medical History:   Diagnosis Date    Chronic right shoulder pain     Hypertriglyceridemia 05/25/2021    no meds     Obesity (BMI 30.0-34. 9)     Smokeless tobacco use       HX OF COVID September- NO TEST, WIFE TEST POSITIVE AND PT HAD FEVER, WEAKNESS WITH VOMITING AND DIARRHEA FOR 1 WEEK  Respiratory history:DENIES SOB                                                                   Respiratory meds:  DENIES    FAMILY PRESENT: NO     PAST SLEEP STUDY:                          DENIES  HX OF KRIS:                                         DENIES  KRIS assessment:                                               SLEEPS ON SIDE       &      BACK          PHYSICAL EXAM   Body mass index is 32.45 kg/m². Visit Vitals  BP (!) 145/85 (BP 1 Location: Left upper arm, BP Patient Position: Sitting)   Pulse 64   Temp 98.8 °F (37.1 °C)   Resp 18   Ht 5' 5\" (1.651 m)   Wt 88.5 kg (195 lb)   SpO2 98%   BMI 32.45 kg/m²     Neck circumference:   38   cm    Loud snoring:                                                 YES            Witnessed apnea or wakening gasping or choking:        DENIES        Awakens with headaches:                                               DENIES  Morning or daytime tiredness/ sleepiness:                           TIRED  Dry mouth or sore throat in morning:                       DENIES                                   Louie stage:  3-4                                   SACS score:4  Stop Bang   STOP-BANG  Does the patient snore loudly (louder than talking or loud enough to be heard through closed doors)?: Yes  Does the patient often feel tired, fatigued, or sleepy during the daytime, even after a \"good\" night's sleep?: Yes  Has anyone ever observed the patient stop breathing during their sleep? : No  Does the patient have or are they being treated for high blood pressure?: No  Is the patient's BMI greater than 35?: No  Is your neck circumference greater than 17 inches (Male) or 16 inches (Female)?: No  Is the patient older than 48?: Yes  Is the patient male?: Yes  KRIS Score: 4  Has the patient been referred to Sleep Medicine?: Yes  Has the patient previously been diagnosed with Obstructive Sleep Apnea?: No                            CS HS  RESPIRATORY ASSESSMENT Q SHIFT   O2 PRN    ALBUTEROL  NEBULIZER Q6 PRN WHEEZING                                            Referrals:    [facility]  PT.  Phone [phone]

## 2021-12-16 LAB
COTININE UR QL SCN: POSITIVE NG/ML
DRUG SCREEN COMMENT:, 753798: ABNORMAL

## 2021-12-16 NOTE — PERIOP NOTES
Contains abnormal data NICOTINE/COTININE, UR, QT  Order: [id number]   Collected 12/13/2021 11:00     Status: Final result     Next appt: 01/03/2022 at 08:15 AM in Internal Medicine ([facility])     0 Result Notes     Ref Range & Units 12/13/21 1100 Resulting Agency   Cotinine Screen, urine Mqcwzz=506 ng/mL Positive Abnormal   [facility]   Drug Screen Comment:   Comment  [facility]   Comment: (NOTE)   This analysis is performed by immunoassay. Positive findings are   unconfirmed analytical test results; if results do not support   expected clinical finding, confirmation by an alternate methodology   is recommended. Patient metabolic variables, specific drug chemistry,   and specimen characteristics can affect test outcome. Technical consultation is available at [email or web address], or   call toll free [phone].    Performed At: [facility] & [address] [id number]   Alexandria Reyes MD SM:[id number]   Performed At: [facility]   [address] [id number]   Mayi Mcdonald PhD W[place]:[id number]               Specimen Collected: 12/13/21 11:00 Last Resulted: 12/16/21 09:36

## 2022-01-04 ENCOUNTER — ANESTHESIA EVENT (OUTPATIENT)
Dept: SURGERY | Age: 52
End: 2022-01-04
Payer: COMMERCIAL

## 2022-01-05 ENCOUNTER — HOME HEALTH ADMISSION (OUTPATIENT)
Dept: HOME HEALTH SERVICES | Facility: HOME HEALTH | Age: 52
End: 2022-01-05
Payer: COMMERCIAL

## 2022-01-05 ENCOUNTER — ANESTHESIA (OUTPATIENT)
Dept: SURGERY | Age: 52
End: 2022-01-05
Payer: COMMERCIAL

## 2022-01-05 ENCOUNTER — HOSPITAL ENCOUNTER (OUTPATIENT)
Age: 52
Discharge: HOME HEALTH | End: 2022-01-05
Attending: ORTHOPAEDIC SURGERY | Admitting: ORTHOPAEDIC SURGERY
Payer: COMMERCIAL

## 2022-01-05 ENCOUNTER — APPOINTMENT (OUTPATIENT)
Dept: GENERAL RADIOLOGY | Age: 52
End: 2022-01-05
Attending: ORTHOPAEDIC SURGERY
Payer: COMMERCIAL

## 2022-01-05 VITALS
OXYGEN SATURATION: 98 % | HEIGHT: 65 IN | SYSTOLIC BLOOD PRESSURE: 118 MMHG | TEMPERATURE: 97.6 F | DIASTOLIC BLOOD PRESSURE: 74 MMHG | RESPIRATION RATE: 17 BRPM | HEART RATE: 71 BPM | WEIGHT: 198 LBS | BODY MASS INDEX: 32.99 KG/M2

## 2022-01-05 PROBLEM — M16.12 OSTEOARTHRITIS OF LEFT HIP: Status: ACTIVE | Noted: 2022-01-05

## 2022-01-05 PROCEDURE — 77030034479 HC ADH SKN CLSR PRINEO J&J -B: Performed by: ORTHOPAEDIC SURGERY

## 2022-01-05 PROCEDURE — 72170 X-RAY EXAM OF PELVIS: CPT

## 2022-01-05 PROCEDURE — 77030040922 HC BLNKT HYPOTHRM STRY -A: Performed by: ANESTHESIOLOGY

## 2022-01-05 PROCEDURE — 76210000063 HC OR PH I REC FIRST 0.5 HR: Performed by: ORTHOPAEDIC SURGERY

## 2022-01-05 PROCEDURE — 97530 THERAPEUTIC ACTIVITIES: CPT

## 2022-01-05 PROCEDURE — 77030007880 HC KT SPN EPDRL BBMI -B: Performed by: ANESTHESIOLOGY

## 2022-01-05 PROCEDURE — 76060000035 HC ANESTHESIA 2 TO 2.5 HR: Performed by: ORTHOPAEDIC SURGERY

## 2022-01-05 PROCEDURE — 76010000171 HC OR TIME 2 TO 2.5 HR INTENSV-TIER 1: Performed by: ORTHOPAEDIC SURGERY

## 2022-01-05 PROCEDURE — 77030002933 HC SUT MCRYL J&J -A: Performed by: ORTHOPAEDIC SURGERY

## 2022-01-05 PROCEDURE — 77030033067 HC SUT PDO STRATFX SPIR J&J -B: Performed by: ORTHOPAEDIC SURGERY

## 2022-01-05 PROCEDURE — 77030003665 HC NDL SPN BBMI -A: Performed by: ANESTHESIOLOGY

## 2022-01-05 PROCEDURE — 74011250636 HC RX REV CODE- 250/636: Performed by: ORTHOPAEDIC SURGERY

## 2022-01-05 PROCEDURE — C1776 JOINT DEVICE (IMPLANTABLE): HCPCS | Performed by: ORTHOPAEDIC SURGERY

## 2022-01-05 PROCEDURE — 74011250636 HC RX REV CODE- 250/636: Performed by: ANESTHESIOLOGY

## 2022-01-05 PROCEDURE — 77030038692 HC WND DEB SYS IRMX -B: Performed by: ORTHOPAEDIC SURGERY

## 2022-01-05 PROCEDURE — 77030033138 HC SUT PGA STRATFX J&J -B: Performed by: ORTHOPAEDIC SURGERY

## 2022-01-05 PROCEDURE — 97165 OT EVAL LOW COMPLEX 30 MIN: CPT

## 2022-01-05 PROCEDURE — 74011000250 HC RX REV CODE- 250: Performed by: ANESTHESIOLOGY

## 2022-01-05 PROCEDURE — 74011250636 HC RX REV CODE- 250/636: Performed by: NURSE PRACTITIONER

## 2022-01-05 PROCEDURE — 77030018836 HC SOL IRR NACL ICUM -A: Performed by: ORTHOPAEDIC SURGERY

## 2022-01-05 PROCEDURE — C1713 ANCHOR/SCREW BN/BN,TIS/BN: HCPCS | Performed by: ORTHOPAEDIC SURGERY

## 2022-01-05 PROCEDURE — 74011250636 HC RX REV CODE- 250/636: Performed by: NURSE ANESTHETIST, CERTIFIED REGISTERED

## 2022-01-05 PROCEDURE — 74011000250 HC RX REV CODE- 250: Performed by: NURSE PRACTITIONER

## 2022-01-05 PROCEDURE — 77030002922 HC SUT FBRWRE ARTH -B: Performed by: ORTHOPAEDIC SURGERY

## 2022-01-05 PROCEDURE — 97161 PT EVAL LOW COMPLEX 20 MIN: CPT

## 2022-01-05 PROCEDURE — 27130 TOTAL HIP ARTHROPLASTY: CPT | Performed by: ORTHOPAEDIC SURGERY

## 2022-01-05 PROCEDURE — 74011250637 HC RX REV CODE- 250/637: Performed by: NURSE PRACTITIONER

## 2022-01-05 PROCEDURE — 74011250637 HC RX REV CODE- 250/637: Performed by: ANESTHESIOLOGY

## 2022-01-05 PROCEDURE — 74011000250 HC RX REV CODE- 250: Performed by: NURSE ANESTHETIST, CERTIFIED REGISTERED

## 2022-01-05 PROCEDURE — 97535 SELF CARE MNGMENT TRAINING: CPT

## 2022-01-05 PROCEDURE — 77030019557 HC ELECTRD VES SEAL MEDT -F: Performed by: ORTHOPAEDIC SURGERY

## 2022-01-05 PROCEDURE — 77030006835 HC BLD SAW SAG STRY -B: Performed by: ORTHOPAEDIC SURGERY

## 2022-01-05 PROCEDURE — 77030018673: Performed by: ORTHOPAEDIC SURGERY

## 2022-01-05 PROCEDURE — 2709999900 HC NON-CHARGEABLE SUPPLY: Performed by: ORTHOPAEDIC SURGERY

## 2022-01-05 DEVICE — 6.5MM LOW PROFILE HEX SCREW 35MM
Type: IMPLANTABLE DEVICE | Site: HIP | Status: FUNCTIONAL
Brand: TRIDENT II

## 2022-01-05 DEVICE — CERAMIC V40 FEMORAL HEAD
Type: IMPLANTABLE DEVICE | Site: HIP | Status: FUNCTIONAL
Brand: BIOLOX

## 2022-01-05 DEVICE — HIP H2 TOT ADV OTHER HD -- IMPL CAPPED H2: Type: IMPLANTABLE DEVICE | Status: FUNCTIONAL

## 2022-01-05 DEVICE — SHELL ACET CLUS H 52E TRTANIUM -- TRIDENT II: Type: IMPLANTABLE DEVICE | Site: HIP | Status: FUNCTIONAL

## 2022-01-05 DEVICE — 127 DEGREE NECK ANGLE HIP STEM
Type: IMPLANTABLE DEVICE | Site: HIP | Status: FUNCTIONAL
Brand: ACCOLADE

## 2022-01-05 DEVICE — TRIDENT X3 10 DEGREE POLYETHYLENE INSERT
Type: IMPLANTABLE DEVICE | Site: HIP | Status: FUNCTIONAL
Brand: TRIDENT X3 INSERT

## 2022-01-05 RX ORDER — ACETAMINOPHEN 500 MG
1000 TABLET ORAL ONCE
Status: DISCONTINUED | OUTPATIENT
Start: 2022-01-05 | End: 2022-01-05 | Stop reason: HOSPADM

## 2022-01-05 RX ORDER — SODIUM CHLORIDE 9 MG/ML
100 INJECTION, SOLUTION INTRAVENOUS CONTINUOUS
Status: DISCONTINUED | OUTPATIENT
Start: 2022-01-05 | End: 2022-01-05 | Stop reason: HOSPADM

## 2022-01-05 RX ORDER — CYCLOBENZAPRINE HCL 5 MG
5 TABLET ORAL
Status: DISCONTINUED | OUTPATIENT
Start: 2022-01-05 | End: 2022-01-05 | Stop reason: HOSPADM

## 2022-01-05 RX ORDER — TRANEXAMIC ACID 650 1/1
1300 TABLET ORAL
Status: COMPLETED | OUTPATIENT
Start: 2022-01-05 | End: 2022-01-05

## 2022-01-05 RX ORDER — ROPIVACAINE HYDROCHLORIDE 2 MG/ML
INJECTION, SOLUTION EPIDURAL; INFILTRATION; PERINEURAL AS NEEDED
Status: DISCONTINUED | OUTPATIENT
Start: 2022-01-05 | End: 2022-01-05 | Stop reason: HOSPADM

## 2022-01-05 RX ORDER — CELECOXIB 200 MG/1
200 CAPSULE ORAL ONCE
Status: COMPLETED | OUTPATIENT
Start: 2022-01-05 | End: 2022-01-05

## 2022-01-05 RX ORDER — CEFAZOLIN SODIUM/WATER 2 G/20 ML
2 SYRINGE (ML) INTRAVENOUS ONCE
Status: COMPLETED | OUTPATIENT
Start: 2022-01-05 | End: 2022-01-05

## 2022-01-05 RX ORDER — SODIUM CHLORIDE 0.9 % (FLUSH) 0.9 %
5-40 SYRINGE (ML) INJECTION EVERY 8 HOURS
Status: DISCONTINUED | OUTPATIENT
Start: 2022-01-05 | End: 2022-01-05 | Stop reason: HOSPADM

## 2022-01-05 RX ORDER — OXYCODONE HYDROCHLORIDE 5 MG/1
10 TABLET ORAL
Status: DISCONTINUED | OUTPATIENT
Start: 2022-01-05 | End: 2022-01-05 | Stop reason: HOSPADM

## 2022-01-05 RX ORDER — SODIUM CHLORIDE, SODIUM LACTATE, POTASSIUM CHLORIDE, CALCIUM CHLORIDE 600; 310; 30; 20 MG/100ML; MG/100ML; MG/100ML; MG/100ML
100 INJECTION, SOLUTION INTRAVENOUS CONTINUOUS
Status: DISCONTINUED | OUTPATIENT
Start: 2022-01-05 | End: 2022-01-05 | Stop reason: HOSPADM

## 2022-01-05 RX ORDER — HYDROMORPHONE HYDROCHLORIDE 2 MG/1
2 TABLET ORAL
Status: DISCONTINUED | OUTPATIENT
Start: 2022-01-05 | End: 2022-01-05

## 2022-01-05 RX ORDER — ACETAMINOPHEN 500 MG
1000 TABLET ORAL EVERY 6 HOURS
Status: DISCONTINUED | OUTPATIENT
Start: 2022-01-06 | End: 2022-01-05 | Stop reason: HOSPADM

## 2022-01-05 RX ORDER — ZOLPIDEM TARTRATE 5 MG/1
5 TABLET ORAL
Status: DISCONTINUED | OUTPATIENT
Start: 2022-01-05 | End: 2022-01-05 | Stop reason: HOSPADM

## 2022-01-05 RX ORDER — PROPOFOL 10 MG/ML
INJECTION, EMULSION INTRAVENOUS
Status: DISCONTINUED | OUTPATIENT
Start: 2022-01-05 | End: 2022-01-05 | Stop reason: HOSPADM

## 2022-01-05 RX ORDER — ASPIRIN 81 MG/1
81 TABLET ORAL EVERY 12 HOURS
Status: DISCONTINUED | OUTPATIENT
Start: 2022-01-05 | End: 2022-01-05 | Stop reason: HOSPADM

## 2022-01-05 RX ORDER — PANTOPRAZOLE SODIUM 40 MG/1
40 TABLET, DELAYED RELEASE ORAL
Status: DISCONTINUED | OUTPATIENT
Start: 2022-01-06 | End: 2022-01-05 | Stop reason: HOSPADM

## 2022-01-05 RX ORDER — HYDROMORPHONE HYDROCHLORIDE 1 MG/ML
1 INJECTION, SOLUTION INTRAMUSCULAR; INTRAVENOUS; SUBCUTANEOUS
Status: DISCONTINUED | OUTPATIENT
Start: 2022-01-05 | End: 2022-01-05 | Stop reason: HOSPADM

## 2022-01-05 RX ORDER — HYDROMORPHONE HYDROCHLORIDE 2 MG/1
2 TABLET ORAL
Status: DISCONTINUED | OUTPATIENT
Start: 2022-01-05 | End: 2022-01-05 | Stop reason: HOSPADM

## 2022-01-05 RX ORDER — ONDANSETRON 4 MG/1
4 TABLET, ORALLY DISINTEGRATING ORAL
Status: DISCONTINUED | OUTPATIENT
Start: 2022-01-05 | End: 2022-01-05 | Stop reason: HOSPADM

## 2022-01-05 RX ORDER — TRANEXAMIC ACID 100 MG/ML
INJECTION, SOLUTION INTRAVENOUS AS NEEDED
Status: DISCONTINUED | OUTPATIENT
Start: 2022-01-05 | End: 2022-01-05 | Stop reason: HOSPADM

## 2022-01-05 RX ORDER — ASPIRIN 81 MG/1
81 TABLET ORAL 2 TIMES DAILY
Status: DISCONTINUED | OUTPATIENT
Start: 2022-01-05 | End: 2022-01-05

## 2022-01-05 RX ORDER — CEFAZOLIN SODIUM/WATER 2 G/20 ML
2 SYRINGE (ML) INTRAVENOUS EVERY 8 HOURS
Status: DISCONTINUED | OUTPATIENT
Start: 2022-01-05 | End: 2022-01-05 | Stop reason: HOSPADM

## 2022-01-05 RX ORDER — MIDAZOLAM HYDROCHLORIDE 1 MG/ML
INJECTION, SOLUTION INTRAMUSCULAR; INTRAVENOUS AS NEEDED
Status: DISCONTINUED | OUTPATIENT
Start: 2022-01-05 | End: 2022-01-05 | Stop reason: HOSPADM

## 2022-01-05 RX ORDER — ONDANSETRON 8 MG/1
8 TABLET, ORALLY DISINTEGRATING ORAL
Status: DISCONTINUED | OUTPATIENT
Start: 2022-01-05 | End: 2022-01-05

## 2022-01-05 RX ORDER — MIDAZOLAM HYDROCHLORIDE 1 MG/ML
2 INJECTION, SOLUTION INTRAMUSCULAR; INTRAVENOUS
Status: COMPLETED | OUTPATIENT
Start: 2022-01-05 | End: 2022-01-05

## 2022-01-05 RX ORDER — LIDOCAINE HYDROCHLORIDE 10 MG/ML
0.3 INJECTION INFILTRATION; PERINEURAL ONCE
Status: COMPLETED | OUTPATIENT
Start: 2022-01-05 | End: 2022-01-05

## 2022-01-05 RX ORDER — KETOROLAC TROMETHAMINE 15 MG/ML
15 INJECTION, SOLUTION INTRAMUSCULAR; INTRAVENOUS EVERY 8 HOURS
Status: DISCONTINUED | OUTPATIENT
Start: 2022-01-05 | End: 2022-01-05 | Stop reason: HOSPADM

## 2022-01-05 RX ORDER — HYDROMORPHONE HYDROCHLORIDE 2 MG/ML
0.5 INJECTION, SOLUTION INTRAMUSCULAR; INTRAVENOUS; SUBCUTANEOUS
Status: DISCONTINUED | OUTPATIENT
Start: 2022-01-05 | End: 2022-01-05 | Stop reason: HOSPADM

## 2022-01-05 RX ORDER — DEXAMETHASONE SODIUM PHOSPHATE 100 MG/10ML
INJECTION INTRAMUSCULAR; INTRAVENOUS AS NEEDED
Status: DISCONTINUED | OUTPATIENT
Start: 2022-01-05 | End: 2022-01-05 | Stop reason: HOSPADM

## 2022-01-05 RX ORDER — NALOXONE HYDROCHLORIDE 0.4 MG/ML
.2-.4 INJECTION, SOLUTION INTRAMUSCULAR; INTRAVENOUS; SUBCUTANEOUS
Status: DISCONTINUED | OUTPATIENT
Start: 2022-01-05 | End: 2022-01-05 | Stop reason: HOSPADM

## 2022-01-05 RX ORDER — AMOXICILLIN 250 MG
1 CAPSULE ORAL DAILY
Status: DISCONTINUED | OUTPATIENT
Start: 2022-01-06 | End: 2022-01-05

## 2022-01-05 RX ORDER — GABAPENTIN 300 MG/1
300 CAPSULE ORAL 2 TIMES DAILY
Status: DISCONTINUED | OUTPATIENT
Start: 2022-01-05 | End: 2022-01-05

## 2022-01-05 RX ORDER — ONDANSETRON 2 MG/ML
INJECTION INTRAMUSCULAR; INTRAVENOUS AS NEEDED
Status: DISCONTINUED | OUTPATIENT
Start: 2022-01-05 | End: 2022-01-05 | Stop reason: HOSPADM

## 2022-01-05 RX ORDER — EPHEDRINE SULFATE/0.9% NACL/PF 50 MG/5 ML
SYRINGE (ML) INTRAVENOUS AS NEEDED
Status: DISCONTINUED | OUTPATIENT
Start: 2022-01-05 | End: 2022-01-05 | Stop reason: HOSPADM

## 2022-01-05 RX ORDER — DEXAMETHASONE SODIUM PHOSPHATE 100 MG/10ML
10 INJECTION INTRAMUSCULAR; INTRAVENOUS ONCE
Status: DISCONTINUED | OUTPATIENT
Start: 2022-01-06 | End: 2022-01-05 | Stop reason: HOSPADM

## 2022-01-05 RX ORDER — KETOROLAC TROMETHAMINE 30 MG/ML
INJECTION, SOLUTION INTRAMUSCULAR; INTRAVENOUS AS NEEDED
Status: DISCONTINUED | OUTPATIENT
Start: 2022-01-05 | End: 2022-01-05 | Stop reason: HOSPADM

## 2022-01-05 RX ORDER — AMOXICILLIN 250 MG
2 CAPSULE ORAL DAILY
Status: DISCONTINUED | OUTPATIENT
Start: 2022-01-06 | End: 2022-01-05 | Stop reason: HOSPADM

## 2022-01-05 RX ORDER — DIPHENHYDRAMINE HCL 25 MG
25 CAPSULE ORAL
Status: DISCONTINUED | OUTPATIENT
Start: 2022-01-05 | End: 2022-01-05 | Stop reason: HOSPADM

## 2022-01-05 RX ORDER — MELOXICAM 7.5 MG/1
15 TABLET ORAL DAILY
Status: DISCONTINUED | OUTPATIENT
Start: 2022-01-06 | End: 2022-01-05

## 2022-01-05 RX ORDER — SODIUM CHLORIDE 0.9 % (FLUSH) 0.9 %
5-40 SYRINGE (ML) INJECTION AS NEEDED
Status: DISCONTINUED | OUTPATIENT
Start: 2022-01-05 | End: 2022-01-05 | Stop reason: HOSPADM

## 2022-01-05 RX ADMIN — Medication 3 AMPULE: at 09:00

## 2022-01-05 RX ADMIN — Medication 2 G: at 10:14

## 2022-01-05 RX ADMIN — LIDOCAINE HYDROCHLORIDE 0.3 ML: 10 INJECTION, SOLUTION INFILTRATION; PERINEURAL at 09:24

## 2022-01-05 RX ADMIN — PHENYLEPHRINE HYDROCHLORIDE 100 MCG: 10 INJECTION INTRAVENOUS at 10:51

## 2022-01-05 RX ADMIN — SODIUM CHLORIDE, SODIUM LACTATE, POTASSIUM CHLORIDE, AND CALCIUM CHLORIDE 100 ML/HR: 600; 310; 30; 20 INJECTION, SOLUTION INTRAVENOUS at 09:24

## 2022-01-05 RX ADMIN — KETOROLAC TROMETHAMINE 15 MG: 15 INJECTION, SOLUTION INTRAMUSCULAR; INTRAVENOUS at 18:10

## 2022-01-05 RX ADMIN — HYDROMORPHONE HYDROCHLORIDE 2 MG: 2 TABLET ORAL at 15:14

## 2022-01-05 RX ADMIN — ONDANSETRON 4 MG: 2 INJECTION INTRAMUSCULAR; INTRAVENOUS at 11:24

## 2022-01-05 RX ADMIN — DEXAMETHASONE SODIUM PHOSPHATE 10 MG: 10 INJECTION INTRAMUSCULAR; INTRAVENOUS at 11:24

## 2022-01-05 RX ADMIN — MIDAZOLAM 2 MG: 1 INJECTION INTRAMUSCULAR; INTRAVENOUS at 10:18

## 2022-01-05 RX ADMIN — SODIUM CHLORIDE, SODIUM LACTATE, POTASSIUM CHLORIDE, AND CALCIUM CHLORIDE: 600; 310; 30; 20 INJECTION, SOLUTION INTRAVENOUS at 11:02

## 2022-01-05 RX ADMIN — PHENYLEPHRINE HYDROCHLORIDE 100 MCG: 10 INJECTION INTRAVENOUS at 10:42

## 2022-01-05 RX ADMIN — Medication 10 MG: at 11:01

## 2022-01-05 RX ADMIN — PHENYLEPHRINE HYDROCHLORIDE 100 MCG: 10 INJECTION INTRAVENOUS at 11:14

## 2022-01-05 RX ADMIN — CELECOXIB 200 MG: 200 CAPSULE ORAL at 09:00

## 2022-01-05 RX ADMIN — Medication 5 MG: at 11:21

## 2022-01-05 RX ADMIN — PHENYLEPHRINE HYDROCHLORIDE 100 MCG: 10 INJECTION INTRAVENOUS at 11:27

## 2022-01-05 RX ADMIN — TRANEXAMIC ACID 1300 MG: 650 TABLET ORAL at 15:11

## 2022-01-05 RX ADMIN — CEFAZOLIN SODIUM 2 G: 10 INJECTION, POWDER, FOR SOLUTION INTRAVENOUS at 18:10

## 2022-01-05 RX ADMIN — MIDAZOLAM 2 MG: 1 INJECTION INTRAMUSCULAR; INTRAVENOUS at 09:27

## 2022-01-05 RX ADMIN — PHENYLEPHRINE HYDROCHLORIDE 100 MCG: 10 INJECTION INTRAVENOUS at 11:05

## 2022-01-05 RX ADMIN — TRANEXAMIC ACID 1000 MG: 100 INJECTION, SOLUTION INTRAVENOUS at 11:37

## 2022-01-05 RX ADMIN — TRANEXAMIC ACID 1300 MG: 650 TABLET ORAL at 18:14

## 2022-01-05 RX ADMIN — PROPOFOL 100 MCG/KG/MIN: 10 INJECTION, EMULSION INTRAVENOUS at 10:30

## 2022-01-05 RX ADMIN — TRANEXAMIC ACID 1000 MG: 100 INJECTION, SOLUTION INTRAVENOUS at 10:22

## 2022-01-05 RX ADMIN — PHENYLEPHRINE HYDROCHLORIDE 100 MCG: 10 INJECTION INTRAVENOUS at 11:12

## 2022-01-05 NOTE — OP NOTES
Total Hip Procedure Note    Mark Rutledge,  [id number],  1970    Pre-operative Diagnosis: Osteoarthritis of left hip, unspecified osteoarthritis type [M16.12]    Post-operative Diagnosis: Same    Procedure: Left Total Hip Arthroplasty    BMI: Body mass index is 32.95 kg/m². Sabine Britt Location: [address] [place] [address]    Surgeon: Ananda Weiss MD    Assistant: Rich Greene, CFA and General Call, NP CFA    Anesthesia: Spinal     Complications: None    EBL: 200cc    Drains: None    Intra-op Findings: Pre-operatively leg lengths were assessed using preop Xrays and with the patient in the lateral decubitus position and operative leg was felt to be similar in length compared to the contralateral leg. The operative hip showed notable cartilage loss of both the femoral head and acetabulum. No intra-operative periprosthetic fracture was encountered. Sciatic nerve was noted to be intact at the end of the procedure. We measured the distance of our resected femoral head/neck from the cut surface to the center of the femoral head to be approximately 35mm. The overall length replaced with the implanted head/neck was 35mm. Intra-operatively we felt that we restored the patients leg lengths to equal lengths using the method described later. Postop with the patient supine we assessed leg lengths and felt they were similar. Patient condition at completion of Procedure: Stable    Implants:   Implant Name Type Inv.  Item Serial No.  Lot No. LRB No. Used Action   PIN FIX TROCAR PT 1 END 9/64X9 IN 1 PT STYL SMOOTH PLN STRL - [id number]  PIN FIX TROCAR PT 1 END 9/64X9 IN 1 PT STYL SMOOTH PLN STRL [id number] [employer] [id number] Left 1 Implanted   PIN FIX TROCAR PT 1 END 9/64X9 IN 1 PT STYL SMOOTH PLN STRL - [id number]  PIN FIX TROCAR PT 1 END 9/64X9 IN 1 PT STYL SMOOTH PLN STRL [id number] [employer] [id number] Left 1 Implanted   SHELL ACET CLUS H 52E TRTANIUM -- TRIDENT II - [id number]  SHELL ACET CLUS H 52E Rosea Counts II [id number] [employer] HOW_ [id number] Left 1 Implanted   LOW PROFILE HEX SCREW 6.5MM   [id number] [employer] ASHLEY [id number] Left 1 Implanted   INSERT ACET E 10 DEG 36 MM HIP X3 TRIDENT - [id number]  INSERT ACET E 10 DEG 36 MM HIP X3 TRIDENT  [employer] HOW_ [id number] Left 1 Implanted   STEM FEM SZ 6 127D [id number] -- ACCOLADE II V40 - [id number]  STEM FEM SZ 6 127D [id number] -- ACCOLADE II V40  [employer] HOW_ [id number] Left 1 Implanted   HEAD FEM DELT V40 +0MM NK 36MM -- V40 BIOLOX - [id number]  HEAD FEM DELT V40 +0MM NK 36MM -- V40 BIOLOX  [employer] [facility]_ [id number] Left 1 Implanted       Description of Procedure    The complexity of the total joint surgery requires the use of a first assistant for positioning, retraction and assistance in closure. Shad Perry was brought to the operating room. Patient was transferred from the stretcher to OR bed. Anesthesia was induced. IV antibiotics were administered per protocol. Shad Perry was positioned in the lateral decubitus position and the pelvis/torso stabilized with posts. The left leg was prepped and draped in the usual sterile fashion. A time out identifying the patient, procedure, operative side and surgeon was administered and charted by the OR Nurse. Prior to incision one gram of TXA was given intravenously. A standard posterior approach was utilized to expose the left hip. The incision was carried through the subcutaneous tissue and underlying fascia with hemostasis obtained using the bovie cauterization and Aquamantys. A Charmley retractor was inserted. We resected any redundent bursal tissue off the external rotators. We were able to identify the piriformis tendon. The short external rotators and capsule were dissected off the posterior femur as a single layer just superior to the piriformis tendon. The sciatic nerve was palpated and protected during dissection. The femoral head was dislocated. The articular surface revealed loss of cartilage, exposed bone and bone spurring. The neck was osteotomized approximately 1cm above the top of the lesser trochanter. We were careful to protect the greater trochanter during osteotomy and protect it from iatrogenic injury. Acetabular retractors were placed both anterior and posterior just superficial to the acetabular labrum. Remaining acetabular labrum was resected and any soft tissue was removed from the acetabulum including any tissue within the codyloid fossa. The acetabular surface revealed loss of cartilage with exposed bone. The acetabulum was sequentially reamed noting proper anteversion and inclination during reaming. The transverse acetabular ligament was used as the primary anatomic landmark to determine version and inclination. The acetabulum was sized to a 52 mm acetabular component. The prepared acetabulum was irrigated of any residual reamings and soft tissue. The permanent acetabular component was impacted into place to achieve appropriate horizontal tilt, anteversion, bone coverage and stability. We visualize that the acetabular component was fully seated. A trial liner was impacted into position. We did not have to excise overhanging osteophytes anterior and posterior  in order to minimize the risk of impingement. We then turned our attention to the proximal femur. A 2-prong proximal femoral retractor was placed. We gained access to the proximal femur using a  and femoral canal finder. The canal was prepared with appropriate lateralization in which we used the initial smaller broaches to remove lateral bone to avoid varus placement of the femoral component. The canal was then broached progressively. The broach was positioned with the appropriate anatomic femoral anteversion. We broached up to a size 6 Accolade II stem. A calcar planar was used.  A trial reduction with a size #6 127 degree Accolade II stem with a +0 neck length and 36 mm head was performed. This was found to be the most stable with maximum hip flexion and with internal/external rotation testing. We did not appreciate any evidence of component or bony impingement. Limb lengths were assessed using three techniques. First, the position of the tip of the operative greater trochanter was compared to the center of the femoral head component and was felt to match this same anatomic relationship as the non-operative hip based on preoperative X-rays. Next, we measured the length of our resected femoral head neck and felt that the trial components matched this resected length as closely as possible when accounting for the length provided by the femoral neck/head. Finally, we compared leg lengths by comparing the possible of the patella with the patients heels together with the patient in the lateral decubitus position. Using these methods it was felt that the patients leg lengths were equilibrated and with appropriate stability as mentioned above. All trial components were removed. Prior to final polyethylene insertion one screw was placed to further stabilize the cup. The final liner was impacted into place which was a 10 degree elevated liner. A cementless size 6 127 degree Accolade II stem was impacted into place carefully. We were careful to observe the calcar region for any iatrogenic fractures during insertion. The permanent femoral head was impacted into place which was a +0mm 36mm ceramic head. Cathryn Kilpatrickley's hip was reduced and stability was as mentioned above. Dilute Betadine solution was allowed to sit in the surgical site for a 3 minute period and copious saline was used to irrigate the surgical site. The sciatic nerve was palpated and noted to be intact.  A periarticular of ropivicaine, toradol, and morphine was injected about the surgical site with care being taken not to inject in the vicinity of neurovascular structures. Prior to wound closure one additional gram of TXA was given for a total of 2 grams. The capsule was repaired with a three #2 Fiberwires secured through drill holes placed in the posterior aspect of the trochanter. No drain was placed. The fascia was closed with a #0 Bidirectional Stratafix barbed suture. The sub-Q layer was closed with 2-0 monocril suture and a  3-0 moncril stratafix suture in a running subcuticular fashion was used to close the skin. The incision site was thoroughly cleaned with alcohol and the Exofin wound closure system was applied to seal it off from external contamination after the overlying skin was thoroughly cleaned with alcohol. A thin layer of [place] lubricant was applied over the wound to keep the dressing from adhering to the overlying sterile bandage and said bandage was placed. Drapes were then broken down and patient was transferred carefully back to the OR stretcher. The sponge and needle counts were correct. The patient tolerated the procedure without difficulty and left the operating room in satisfactory condition.     Signed By: Olena Castaneda MD

## 2022-01-05 NOTE — DISCHARGE INSTRUCTIONS
Patient Education     DISCHARGE SUMMARY from Nurse    PATIENT INSTRUCTIONS:    After general anesthesia or intravenous sedation, for 24 hours or while taking prescription Narcotics:  · Limit your activities  · Do not drive and operate hazardous machinery  · Do not make important personal or business decisions  · Do  not drink alcoholic beverages  · If you have not urinated within 8 hours after discharge, please contact your surgeon on call. Report the following to your surgeon:  · Excessive pain, swelling, redness or odor of or around the surgical area  · Temperature over 100.5  · Nausea and vomiting lasting longer than 4 hours or if unable to take medications  · Any signs of decreased circulation or nerve impairment to extremity: change in color, persistent  numbness, tingling, coldness or increase pain  · Any questions    What to do at Home:  *  Please give a list of your current medications to your Primary Care Provider. *  Please update this list whenever your medications are discontinued, doses are      changed, or new medications (including over-the-counter products) are added. *  Please carry medication information at all times in case of emergency situations. These are general instructions for a healthy lifestyle:    No smoking/ No tobacco products/ Avoid exposure to second hand smoke  Surgeon General's Warning:  Quitting smoking now greatly reduces serious risk to your health. Obesity, smoking, and sedentary lifestyle greatly increases your risk for illness    A healthy diet, regular physical exercise & weight monitoring are important for maintaining a healthy lifestyle    You may be retaining fluid if you have a history of heart failure or if you experience any of the following symptoms:  Weight gain of 3 pounds or more overnight or 5 pounds in a week, increased swelling in our hands or feet or shortness of breath while lying flat in bed.   Please call your doctor as soon as you notice any of these symptoms; do not wait until your next office visit. The discharge information has been reviewed with the {PATIENT PARENT GUARDIAN:38860}. The {PATIENT PARENT GUARDIAN:92579} verbalized understanding. Discharge medications reviewed with the {Dishcarge meds reviewed ICYQ:91683} and appropriate educational materials and side effects teaching were provided. ___________________________________________________________________________________________________________________________________     Hip Replacement Surgery (Posterior): What to Expect at Home  Your Recovery  Hip replacement surgery replaces the worn parts of your hip joint. When you leave the hospital, you will probably be walking with crutches or a walker. You may be able to climb a few stairs and get in and out of bed and chairs. But you will need someone to help you at home until you have more energy and can move around better. You will go home with a bandage and stitches, staples, skin glue, or tape strips. You can remove the bandage when your doctor tells you to. If you have stitches or staples, your doctor will remove them about 2 weeks after your surgery. Glue or tape strips will fall off on their own over time. You may still have some mild pain, and the area may be swollen for 3 to 4 months after surgery. Your doctor may give you medicine for the pain. You will continue the rehabilitation program (rehab) you started in the hospital. The better you do with your rehab exercises, the sooner you will get your strength and movement back. Most people are able to return to work 4 weeks to 4 months after surgery. This care sheet gives you a general idea about how long it will take for you to recover. But each person recovers at a different pace. Follow the steps below to get better as quickly as possible. How can you care for yourself at home?   Activity    · Your doctor may not want your affected leg to cross the center of your body toward the other leg. If so, your therapist may suggest these ideas:  ? Do not cross your legs. ? Be very careful as you get in or out of bed or a car so your leg does not cross the imaginary line in the middle of your body.     · Go slowly when you climb stairs. Make sure the lights are on. Have someone watch you, if you can. When you climb stairs:  ? Step up first with your unaffected leg. Then bring the affected leg up to the same step. Bring your crutches or cane up. ? To go down stairs, reverse the order. First, put your crutches or cane on the lower step. Then bring the affected leg down to that step. Finally, step down with the unaffected leg.     · You can ride in a car, but stop at least once every hour to get out and walk around.     · You may want to sleep on your back. Don't reach down too far to pull up blankets when you lie in bed.     · If your doctor recommends exercises, do them as directed. You can cut back on your exercises if your muscles start to ache, but don't stop doing them.     · Rest when you feel tired. You may take a nap, but don't stay in bed all day.     · Work with your physical therapist to learn the best way to exercise. You will probably have to use a walker, crutches, or a cane for at least 4 to 6 weeks.     · Your doctor may advise you to stay away from activities that put stress on the joint. This includes sports such as tennis, football, and jogging.     · Try not to sit for too long at one time. You will feel less stiff if you take a short walk about every hour. When you sit, use chairs with arms, and don't sit in low chairs.     · Do not bend over more than 90 degrees (like the angle in a letter \"L\").     · Sleep on your back with your legs slightly apart or on your side with a pillow between your knees for about 6 weeks or as your doctor tells you.  Do not sleep on your stomach or affected leg.     · Ask your doctor when you can drive again.     · Most people are able to return to work 4 weeks to 4 months after surgery.     · Ask your doctor when it is okay for you to have sex. Diet    · By the time you leave the hospital, you will probably be eating your normal diet. Your doctor may recommend that you take iron and vitamin supplements.     · Drink plenty of fluids (unless your doctor tells you not to).   · Eat healthy foods, and watch your portion sizes. Try to stay at your ideal weight. Too much weight puts more stress on your new hip joint.     · You may notice that your bowel movements are not regular right after your surgery. This is common. Try to avoid constipation and straining with bowel movements. You may want to take a fiber supplement every day. If you have not had a bowel movement after a couple of days, ask your doctor about taking a mild laxative. Medicines    · Your doctor will tell you if and when you can restart your medicines. You will also get instructions about taking any new medicines.     · If you take aspirin or some other blood thinner, ask your doctor if and when to start taking it again. Make sure that you understand exactly what your doctor wants you to do.     · Your doctor may give you a blood-thinning medicine to prevent blood clots. If you take a blood thinner, be sure you get instructions about how to take your medicine safely. Blood thinners can cause serious bleeding problems. This medicine could be in pill form or as a shot (injection). If a shot is necessary, your doctor will tell you how to do this.     · Be safe with medicines. Take pain medicines exactly as directed. ? If the doctor gave you a prescription medicine for pain, take it as prescribed. ? If you are not taking a prescription pain medicine, ask your doctor if you can take an over-the-counter medicine.     · If you think your pain medicine is making you sick to your stomach:  ? Take your medicine after meals (unless your doctor has told you not to). ?  Ask your doctor for a different pain medicine.     · If your doctor prescribed antibiotics, take them as directed. Do not stop taking them just because you feel better. You need to take the full course of antibiotics. Incision care    · If your doctor told you how to care for your cut (incision), follow your doctor's instructions. You will have a dressing over the cut. A dressing helps the incision heal and protects it. Your doctor will tell you how to take care of this.     · If you did not get instructions, follow this general advice:  ? If you have strips of tape on the cut the doctor made, leave the tape on for a week or until it falls off.  ? If you have stitches or staples, your doctor will tell you when to come back to have them removed. ? If you have skin glue on the cut, leave it on until it falls off. Skin glue is also called skin adhesive or liquid stitches. ? Change the bandage every day. ? Wash the area daily with warm water, and pat it dry. Don't use hydrogen peroxide or alcohol. They can slow healing. ? You may cover the area with a gauze bandage if it oozes fluid or rubs against clothing. ? You may shower 24 to 48 hours after surgery. Pat the incision dry. Don't swim or take a bath for the first 2 weeks, or until your doctor tells you it is okay. Exercise    · Your physical therapist will teach you exercises to do at home. Always do them as your therapist tells you.     · Avoid activities where you might fall. Ice and elevation    · For pain, put ice or a cold pack on the area for 10 to 20 minutes at a time. Put a thin cloth between the ice and your skin.     · Your ankle may swell for about 3 months. Prop up your ankle when you ice it or anytime you sit or lie down. Try to keep it above the level of your heart. This will help reduce swelling. Other instructions    · Wear compression stockings if your doctor told you to. These may help to prevent blood clots.  Your doctor will tell you how long you need to keep wearing the compression stockings.     · Try to prevent falls. To avoid falling:  ? Arrange furniture so that you will not trip on it. ? Get rid of throw rugs, and move electrical cords out of the way. ? Walk only in areas with plenty of light. ? Put grab bars in showers and bathtubs. ? Try to avoid icy or snowy sidewalks. Choose shoes with good traction, or consider using traction devices that attach to your shoes. ? Wear shoes with sturdy, flat soles. Follow-up care is a key part of your treatment and safety. Be sure to make and go to all appointments, and call your doctor if you are having problems. It's also a good idea to know your test results and keep a list of the medicines you take. When should you call for help? Call 911 anytime you think you may need emergency care. For example, call if:    · You passed out (lost consciousness).     · You have severe trouble breathing.     · You have sudden chest pain and shortness of breath, or you cough up blood. Call your doctor now or seek immediate medical care if:    · You have signs that your hip may be dislocated, including:  ? Severe pain and not being able to stand. ? A crooked leg that looks like your hip is out of position. ? Not being able to bend or straighten your leg.     · Your leg or foot is cool or pale or changes color.     · You cannot feel or move your leg.     · You have signs of a blood clot, such as:  ? Pain in your calf, back of the knee, thigh, or groin. ? Redness and swelling in your leg or groin.     · Your incision comes open and begins to bleed, or the bleeding increases.     · You feel like your heart is racing or beating irregularly.     · You have signs of infection, such as:  ? Increased pain, swelling, warmth, or redness. ? Red streaks leading from the incision. ? Pus draining from the incision. ? A fever.    Watch closely for changes in your health, and be sure to contact your doctor if:    · You do not have a bowel movement after taking a laxative.     · You do not get better as expected. Where can you learn more? Go to http://www.gray.com/  Enter Q746 in the search box to learn more about \"Hip Replacement Surgery (Posterior): What to Expect at Home. \"  Current as of: July 1, 2021               Content Version: 13.0  © 7638-2584 [employer]. Care instructions adapted under license by [employer] (which disclaims liability or warranty for this information). If you have questions about a medical condition or this instruction, always ask your healthcare professional. [place] [address] any warranty or liability for your use of this information.

## 2022-01-05 NOTE — ANESTHESIA PREPROCEDURE EVALUATION
Relevant Problems   ENDOCRINE   (+) Class 1 obesity due to excess calories without serious comorbidity with body mass index (BMI) of 33.0 to 33.9 in adult       Anesthetic History   No history of anesthetic complications            Review of Systems / Medical History  Patient summary reviewed, nursing notes reviewed and pertinent labs reviewed    Pulmonary  Within defined limits                 Neuro/Psych   Within defined limits           Cardiovascular  Within defined limits                Exercise tolerance: >4 METS     GI/Hepatic/Renal  Within defined limits              Endo/Other        Obesity     Other Findings              Physical Exam    Airway  Mallampati: II  TM Distance: 4 - 6 cm  Neck ROM: normal range of motion   Mouth opening: Normal     Cardiovascular    Rhythm: regular  Rate: normal    Murmur: Grade 1, Aortic area     Dental  No notable dental hx       Pulmonary  Breath sounds clear to auscultation               Abdominal         Other Findings            Anesthetic Plan    ASA: 2  Anesthesia type: spinal          Induction: Intravenous  Anesthetic plan and risks discussed with: Patient, Family and Spouse

## 2022-01-05 NOTE — PROGRESS NOTES
Problem: Self Care Deficits Care Plan (Adult)  Goal: *Acute Goals and Plan of Care (Insert Text)  Outcome: Progressing Towards Goal  Note: GOALS:   DISCHARGE GOALS (in preparation for going home/rehab):  3 days  1. Mr. Martir Medina will perform one lower body dressing activity with minimal assistance with adaptive equipment to demonstrate improved functional mobility and safety. 2.  Mr. Martir Medina will perform one lower body bathing activity with minimal  assistance with adaptive equipment to demonstrate improved functional mobility and safety. 3.  Mr. Martir Medina will perform toileting/toilet transfer with contact guard assistance with adaptive equipment to demonstrate improved functional mobility and safety. 4.  Mr. Martir Medina will perform shower transfer with contact guard assistance with adaptive equipment to demonstrate improved functional mobility and safety. 5.  Mr. Martir Medina will state CHRYSTAL precautions with two verbal cues to demonstrate improved functional mobility and safety. JOINT CAMP OCCUPATIONAL THERAPY CHRYSTAL: Initial Assessment and PM 1/5/2022  OUTPATIENT: Hospital Day: 1  Payor: Maria Guadalupe Gutierres / Plan: [facility] / Product Type: PPO /      NAME/AGE/GENDER: Chuy Peñaloza is a 46 y.o. male   PRIMARY DIAGNOSIS:  Osteoarthritis of left hip, unspecified osteoarthritis type [M16.12]   Procedure(s) and Anesthesia Type:     * LEFT HIP ARTHROPLASTY TOTAL/ BOAZ - Spinal (Left)  ICD-10: Treatment Diagnosis:    Pain in left hip (M25.552)  Stiffness of Left Hip, Not elsewhere classified (M25.652)  Other lack of cordination (R27.8)  Difficulty in walking, Not elsewhere classified (R26.2)  Other abnormalities of gait and mobility (R26.89)      ASSESSMENT:     Mr. Martir Medina is s/p left CHRYSTAL and presents with decreased weight bearing on left LE and decreased independence with functional mobility and activities of daily living as compared to prior level of function and safety.   Patient would benefit from skilled Occupational Therapy to maximize independence and safety with self-care task and functional mobility. Pt would also benefit from education on lower body adaptive equipment and hip precautions post-surgery in preparation for going home. Patient plans for further rehab at home with home health services and good family support . OT reviewed therapy schedule and plan of care with patient. Patient was able to transfer and perform self care skills as charted below. Patient instructed to call for assistance when needing to get up from the recliner and all needs in reach. Patient verbalized understanding of call light. He transferred oob and got dressed. He ambulated down the hallway and practiced steps with PT. He returend to room and used the restroom. He returend to recliner and was educated on post op ADL task performance. He was issued a long handle sponge and was edcuated on  hip kit. He plans to discharge home today with wifes assistance. This section established at most recent assessment   PROBLEM LIST (Impairments causing functional limitations):  Decreased Strength  Decreased ADL/Functional Activities  Decreased Transfer Abilities  Increased Pain  Increased Fatigue  Decreased Flexibility/Joint Mobility  Decreased Knowledge of Precautions   INTERVENTIONS PLANNED: (Benefits and precautions of occupational therapy have been discussed with the patient.)  Activities of daily living training  Adaptive equipment training  Balance training  Clothing management  Donning&doffing training  Theraputic activity     TREATMENT PLAN: Frequency/Duration: Follow patient 1-2 times to address above goals. Rehabilitation Potential For Stated Goals: Good     RECOMMENDED REHABILITATION/EQUIPMENT: (at time of discharge pending progress): Continue Skilled Therapy. OCCUPATIONAL PROFILE AND HISTORY:   History of Present Injury/Illness (Reason for Referral): Pt presents this date s/p (left) CHRYSTAL.     Past Medical History/Comorbidities:   Mr. Saba Villanueva  has a past medical history of Chronic right shoulder pain, Hypertriglyceridemia (05/25/2021), Obesity (BMI 30.0-34.9), and Smokeless tobacco use. Mr. Saba Villanueva  has a past surgical history that includes hx wisdom teeth extraction and colonoscopy (N/A, 8/3/2021). Social History/Living Environment:   Home Environment: Private residence  # Steps to Enter: 1  Rails to Enter: No  One/Two Story Residence: One story  Living Alone: No  Support Systems: Spouse/Significant Other  Patient Expects to be Discharged to[de-identified] [employer]  Current DME Used/Available at Home: None  Tub or Shower Type: Tub/Shower combination    Prior Level of Function/Work/Activity:  Mod I     Number of Personal Factors/Comorbidities that affect the Plan of Care: Brief history (0):  LOW COMPLEXITY   ASSESSMENT OF OCCUPATIONAL PERFORMANCE[de-identified]   Most Recent Physical Functioning:   Balance  Sitting: Intact  Standing: With support       Gross Assessment  AROM: Within functional limits (right LE)  Strength: Within functional limits (right LE)          LLE Assessment  LLE Assessment (WDL): Exception to WDL  LLE AROM  L Hip Flexion: 90  L Hip ABduction: 10 Coordination  Fine Motor Skills-Upper: Left Intact; Right Intact  Gross Motor Skills-Upper: Left Intact; Right Intact         Mental Status  Neurologic State: Alert; Appropriate for age  Orientation Level: Appropriate for age  Cognition: Appropriate decision making; Appropriate for age attention/concentration; Appropriate safety awareness; Follows commands  Perception: Appears intact  Perseveration: No perseveration noted  Safety/Judgement: Fall prevention                Basic ADLs (From Assessment) Complex ADLs (From Assessment)   Basic ADL  Feeding: Independent  Oral Facial Hygiene/Grooming: Supervision  Bathing: Minimum assistance  Upper Body Dressing: Supervision  Lower Body Dressing: Minimum assistance,Moderate assistance  Toileting: Minimum assistance Grooming/Bathing/Dressing Activities of Daily Living     Cognitive Retraining  Safety/Judgement: Fall prevention                       Bed/Mat Mobility  Sit to Supine: Minimum assistance  Sit to Stand: Stand-by assistance;Contact guard assistance  Stand to Sit: Stand-by assistance  Bed to Chair: Contact guard assistance;Stand-by assistance         Physical Skills Involved:  Balance  Strength  Activity Tolerance Cognitive Skills Affected (resulting in the inability to perform in a timely and safe manner):  none Psychosocial Skills Affected:  Self-Awareness   Number of elements that affect the Plan of Care: 3-5:  MODERATE COMPLEXITY   CLINICAL DECISION MAKIN[address] [place] [address] AM-PAC 6 Clicks   Daily Activity Inpatient Short Form  How much help from another person does the patient currently need. .. Total A Lot A Little None   1. Putting on and taking off regular lower body clothing? [] 1   [x] 2   [] 3   [] 4   2. Bathing (including washing, rinsing, drying)? [] 1   [] 2   [x] 3   [] 4   3. Toileting, which includes using toilet, bedpan or urinal?   [] 1   [] 2   [x] 3   [] 4   4. Putting on and taking off regular upper body clothing? [] 1   [] 2   [] 3   [x] 4   5. Taking care of personal grooming such as brushing teeth? [] 1   [] 2   [] 3   [x] 4   6. Eating meals? [] 1   [] 2   [] 3   [x] 4   © , Trustees of [address] [place] [address], under license to [employer]. All rights reserved     Score:  Initial: 20 Most Recent: X (Date: -- )    Interpretation of Tool:  Represents activities that are increasingly more difficult (i.e. Bed mobility, Transfers, Gait).      Medical Necessity:     · Patient is expected to demonstrate progress in   · Self care skills and functional mobility  ·     Reason for Services/Other Comments:  · Patient continues to require skilled intervention due to   · Above listed deficits     Use of outcome tool(s) and clinical judgement create a POC that gives a: LOW COMPLEXITY TREATMENT:   (In addition to Assessment/Re-Assessment sessions the following treatments were rendered)     Pre-treatment Symptoms/Complaints:    Pain: Initial:   Pain Intensity 1: 0  Post Session:  0/10     Self Care: (30): Procedure(s) (per grid) utilized to improve and/or restore self-care/home management as related to dressing, bathing, toileting, grooming, and functional mobility . Required minimal visual, verbal, manual, and tactile cueing to facilitate activities of daily living skills and compensatory activities. Assessment complete    Treatment/Session Assessment:     Response to Treatment:  tolerated well. Education:  [] Home Exercises  [x] Fall Precautions  [x] Hip Precautions [] Going Home Video  [] Knee/Hip Prosthesis Review  [x] Walker Management/Safety [x] Adaptive Equipment as Needed       Interdisciplinary Collaboration:   Physical Therapist  Occupational Therapist  Registered Nurse    After treatment position/precautions:   Up in chair  Bed/Chair-wheels locked  Bed in low position  Caregiver at bedside  Call light within reach  RN notified  Family at bedside     Compliance with Program/Exercises: Compliant all of the time. Recommendations/Intent for next treatment session:  Treatment next visit will focus on increasing Mr. Yari Le independence with bed mobility, transfers, self care, functional mobility, modalities for pain, and patient education.       Total Treatment Duration:  OT Patient Time In/Time Out  Time In: 1615  Time Out: [address] [place], OT

## 2022-01-05 NOTE — PROGRESS NOTES
Care Management Interventions  PCP Verified by CM: Yes  Mode of Transport at Discharge: Self  Transition of Care Consult (CM Consult): [address]: Yes  Discharge Durable Medical Equipment: Yes  Physical Therapy Consult: Yes  Occupational Therapy Consult: Yes  Support Systems: Spouse/Significant Other  Confirm Follow Up Transport: Family  Discharge Location  Discharge Placement: Home with home health    Patient is a 46y.o. year old male admitted for Left CHRYSTAL . Patient plans to return home on discharge. Order received to arrange home health. Patient without preference towards agency. Referral sent to [facility]. Patient requesting we arrange a walker. Referral sent to [address] [place] [address]. delivered to the hospital room prior to discharge. Will follow until discharge.

## 2022-01-05 NOTE — PROGRESS NOTES
TRANSFER - IN REPORT:    Verbal report received from Leopold Coats, RN on Shad Perry  being received from PACU for ordered procedure      Report consisted of patients Situation, Background, Assessment and   Recommendations(SBAR). Information from the following report(s) SBAR was reviewed with the receiving nurse. Opportunity for questions and clarification was provided. Assessment completed upon patients arrival to unit and care assumed. Oriented to room, bed controls, and how to order meals. No complaints. L hip with ABDs dry and intact. SCDs and gripper socks to LEs. Family in room.

## 2022-01-05 NOTE — PROGRESS NOTES
Problem: Mobility Impaired (Adult and Pediatric)  Goal: *Acute Goals and Plan of Care (Insert Text)  Outcome: Progressing Towards Goal  Note: GOALS (1-4 days):  (1.)Mr. Marbin Boyd will move from supine to sit and sit to supine  in bed with SUPERVISION. (2.)Mr. Marbin Boyd will transfer from bed to chair and chair to bed with SUPERVISION using the least restrictive device. (3.)Mr. Marbin Boyd will ambulate with SUPERVISION for 300 feet with the least restrictive device. (4.)Mr. Marbin Boyd will ambulate up/down 3 steps with bilateral  railing with SUPERVISION with no device. (5.)Mr. Marbin Boyd will be independent with HEP and recall 3/3 neo precautions. ________________________________________________________________________________________________       PHYSICAL THERAPY JOINT CAMP NEO: Initial Assessment and PM 1/5/2022  OUTPATIENT: Hospital Day: 1  Payor: [place] Form / Plan: [facility] / Product Type: PPO /      NAME/AGE/GENDER: Brittany Carter is a 46 y.o. male   PRIMARY DIAGNOSIS:  Osteoarthritis of left hip, unspecified osteoarthritis type [M16.12]   Procedure(s) and Anesthesia Type:     * LEFT HIP ARTHROPLASTY TOTAL/ BOAZ - Spinal (Left)  ICD-10: Treatment Diagnosis:    Pain in left hip (M25.552)  Stiffness of Left Hip, Not elsewhere classified (M25.652)  Difficulty in walking, Not elsewhere classified (R26.2)      ASSESSMENT:     Mr. Marbin Boyd presents with decreased strength and range of motion left lower extremity and with decreased independence with functional mobility s/p  left total hip arthroplasty. Pt will benefit from skilled PT interventions to maximize independence with functional mobility and NEO management. Pt did well with assessment and gait and steps and exercises. Pt plans to discharge to home today from the hospital with continued therapy for follow up. Pt. Orlando Jc to go home today. Wife present. Reviewed safety and neo precautions. They had no questions.   He ambulated well with RW and did steps well and used restroom standing with walker. He did chrystal exercises with cues only. This section established at most recent assessment   PROBLEM LIST (Impairments causing functional limitations):  Decreased Strength  Decreased ADL/Functional Activities  Decreased Transfer Abilities  Decreased Ambulation Ability/Technique  Increased Pain  Decreased Flexibility/Joint Mobility  Decreased [place] with Home Exercise Program   INTERVENTIONS PLANNED: (Benefits and precautions of physical therapy have been discussed with the patient.)  Bed Mobility  Cold  Gait Training  Home Exercise Program (HEP)  Range of Motion (ROM)  Therapeutic Activites  Therapeutic Exercise/Strengthening  Transfer Training     TREATMENT PLAN: Frequency/Duration: Follow patient BID for duration of hospital stay to address above goals. Rehabilitation Potential For Stated Goals: Good     RECOMMENDED REHABILITATION/EQUIPMENT: (at time of discharge pending progress): Continue Skilled Therapy and Home Health: Physical Therapy. HISTORY:   History of Present Injury/Illness (Reason for Referral):  Pt s/p CHRYSTAL on 1/5  Past Medical History/Comorbidities:   Mr. Lety West  has a past medical history of Chronic right shoulder pain, Hypertriglyceridemia (05/25/2021), Obesity (BMI 30.0-34.9), and Smokeless tobacco use. Mr. Lety West  has a past surgical history that includes hx wisdom teeth extraction and colonoscopy (N/A, 8/3/2021).    Social History/Living Environment:   Home Environment: Private residence  # Steps to Enter: 1  Rails to Enter: No  One/Two Story Residence: One story  Living Alone: No  Support Systems: Spouse/Significant Other  Patient Expects to be Discharged to[de-identified] [employer]  Current DME Used/Available at Home: None  Tub or Shower Type: Tub/Shower combination    Prior Level of Function/Work/Activity:  independent   Number of Personal Factors/Comorbidities that affect the Plan of Care: 1-2: MODERATE COMPLEXITY EXAMINATION:   Most Recent Physical Functioning:      Gross Assessment  AROM: Within functional limits (right LE)  Strength: Within functional limits (right LE)          LLE AROM  L Hip Flexion: 90  L Hip ABduction: 10          Bed Mobility  Sit to Supine: Minimum assistance    Transfers  Sit to Stand: Stand-by assistance;Contact guard assistance  Stand to Sit: Stand-by assistance  Bed to Chair: Contact guard assistance;Stand-by assistance    Balance  Sitting: Intact  Standing: With support              Weight Bearing Status  Left Side Weight Bearing: As tolerated  Distance (ft): 300 Feet (ft)  Ambulation - Level of Assistance: Stand-by assistance  Assistive Device: Walker, rolling  Speed/Ame: Delayed  Step Length: Left shortened;Right shortened  Stance: Left decreased  Gait Abnormalities: Antalgic;Decreased step clearance  Number of Stairs Trained: 3 (x 2, also one step with walker)  Stairs - Level of Assistance: Contact guard assistance;Stand-by assistance  Rail Use: Both  Interventions: Safety awareness training;Verbal cues     Braces/Orthotics:     Left Hip Cold  Type: Cold/ice pack      Body Structures Involved:  Joints  Muscles Body Functions Affected: Movement Related Activities and Participation Affected: Mobility  Self Care   Number of elements that affect the Plan of Care: 4+: HIGH COMPLEXITY   CLINICAL PRESENTATION:   Presentation: Stable and uncomplicated: LOW COMPLEXITY   CLINICAL DECISION MAKIN [place] [id number] AM-PAC 6 Clicks   Basic Mobility Inpatient Short Form  How much difficulty does the patient currently have. .. Unable A Lot A Little None   1. Turning over in bed (including adjusting bedclothes, sheets and blankets)? [] 1   [] 2   [] 3   [x] 4   2. Sitting down on and standing up from a chair with arms ( e.g., wheelchair, bedside commode, etc.)   [] 1   [] 2   [] 3   [x] 4   3. Moving from lying on back to sitting on the side of the bed?    [] 1   [] 2   [x] 3   [] 4   How much help from another person does the patient currently need. .. Total A Lot A Little None   4. Moving to and from a bed to a chair (including a wheelchair)? [] 1   [] 2   [] 3   [x] 4   5. Need to walk in hospital room? [] 1   [] 2   [] 3   [x] 4   6. Climbing 3-5 steps with a railing? [] 1   [] 2   [] 3   [x] 4   © 2007, Trustees of [address] [place] [address], under license to [employer]. All rights reserved     Score:  Initial: 23 Most Recent: X (Date: -- )    Interpretation of Tool:  Represents activities that are increasingly more difficult (i.e. Bed mobility, Transfers, Gait). Medical Necessity:     Patient is expected to demonstrate progress in   strength, range of motion, and functional technique   to   decrease assistance required with functional mobility and CHRYSTAL management  . Reason for Services/Other Comments:  Patient continues to require skilled intervention due to   Inability to complete functional mobility and CHRYSTAL management independently  . Use of outcome tool(s) and clinical judgement create a POC that gives a: Clear prediction of patient's progress: LOW COMPLEXITY            TREATMENT:   (In addition to Assessment/Re-Assessment sessions the following treatments were rendered)     Pre-treatment Symptoms/Complaints:  hip sore  Pain Initial:      Post Session:  did not rate   assessment  Therapeutic Activity: (  40 Minutes ):  Therapeutic activities including Bed transfers, Chair transfers, Toilet transfers, Ambulation on level ground, Stairs, and standing and exercises below to improve mobility, strength, and balance. Required minimal Safety awareness training;Verbal cues to promote static and dynamic balance in standing.       Date:  1/5 Date:   Date:     ACTIVITY/EXERCISE AM PM AM PM AM PM     []  []  []  []  []  []   Ankle Pumps  10       Quad Sets  10       Gluteal Sets  10       Hip ABd/ADduction  10       Knee Slides  10       Short Arc Quads  10       Long Arc Quads B = bilateral; AA = active assistive; A = active; P = passive      Treatment/Session Assessment:     Response to Treatment:  did well. Education:  [x] Home Exercises  [x] Fall Precautions  [x] Hip Precautions [x] D/C Instruction Review  [x] Hip Prosthesis Review  [x] Walker Management/Safety [] Adaptive Equipment as Needed       Interdisciplinary Collaboration:   Occupational Therapist  Registered Nurse    After treatment position/precautions:   Up in chair  Bed/Chair-wheels locked  Bed in low position  Caregiver at bedside  Call light within reach  Family at bedside    Compliance with Program/Exercises: Compliant most of the time. Recommendations/Intent for next treatment session:  Treatment next visit will focus on increasing Mr. Cailin Delacruz independence with bed mobility, transfers, gait training, strength/ROM exercises, modalities for pain, and patient education.       Total Treatment Duration:  PT Patient Time In/Time Out  Time In: 2961  Time Out: [address], PT

## 2022-01-05 NOTE — H&P
Patient ID:  Bradley Thomas  [id number]  59 y.o.  1970    Today: January 5, 2022          CC:  Left  Knee pain    HPI:   The patient has end stage arthritis of the left knee. The patient was evaluated and examined during consultation prior to today. The patient complains of knee pain with activities, reports pain as mostly occurring along the joint lines, reports stiffness of the knee after inactivity, and swelling/pain at the end of the day and after increased physical activity. The pain affects the patients activities of daily living and quality of life. The patient has attempted and exhausted conservative treatment. There have been no changes to the patient's orthopedic condition since the last office visit. Past Medical History:  Past Medical History:   Diagnosis Date    Chronic right shoulder pain     Hypertriglyceridemia 05/25/2021    no meds     Obesity (BMI 30.0-34. 9)     Smokeless tobacco use        Past Surgical History:  Past Surgical History:   Procedure Laterality Date    COLONOSCOPY N/A 8/3/2021    COLONOSCOPY/BMI 33 performed by Virgen Martinez DO at [address] [place] [address] HX WISDOM TEETH EXTRACTION          Medications:     Prior to Admission medications    Medication Sig Start Date End Date Taking? Authorizing Provider   acetaminophen (TYLENOL) 500 mg tablet Take 2 Tablets by mouth every six (6) hours. 12/29/21   Azalea Bautista MD   aspirin delayed-release 81 mg tablet Take 1 Tablet by mouth two (2) times a day. 12/29/21   Azalea Bautista MD   cyclobenzaprine (FLEXERIL) 5 mg tablet Take 1 Tablet by mouth three (3) times daily as needed for Muscle Spasm(s). 12/29/21   Azalea Bautista MD   gabapentin (NEURONTIN) 300 mg capsule Take 1 Capsule by mouth two (2) times a day. 12/29/21   Azalea Bautista MD   HYDROmorphone (DILAUDID) 2 mg tablet Take 1 Tablet by mouth every four (4) hours as needed for Pain for up to 7 days.  Max Daily Amount: 12 mg. 12/29/21 1/5/22  Sheila Fowler Lucero Gan MD   meloxicam (MOBIC) 15 mg tablet Take 1 Tablet by mouth daily. 12/29/21   Subhash Burkett MD   omeprazole (PRILOSEC) 40 mg capsule Take 1 Capsule by mouth daily. 12/29/21   Subhash Burkett MD   ondansetron (ZOFRAN ODT) 8 mg disintegrating tablet Take 1 Tablet by mouth every eight (8) hours as needed for Nausea or Vomiting. 12/29/21   Subhash Burkett MD   senna-docusate (PERICOLACE) 8.6-50 mg per tablet Take 1 Tablet by mouth daily. 12/29/21   Subhash Burkett MD   zolpidem (AMBIEN) 5 mg tablet Take 1 Tablet by mouth nightly as needed for Sleep. Max Daily Amount: 5 mg. 12/29/21   Subhash Burkett MD       Family History:     Family History   Problem Relation Age of Onset   Milton Stager COPD Mother     Diabetes Father     Cancer Father         prostate       Social History:      Social History     Tobacco Use    Smoking status: Never Smoker    Smokeless tobacco: Current User    Tobacco comment: dip   Substance Use Topics    Alcohol use: Yes     Comment: 2 drinks per day/14 per week          Allergies:    No Known Allergies     Vitals: There were no vitals taken for this visit. Objective:         General: No Acute distress                   HEENT: Normocephalic/atramatic                   Lungs:  Breathing non-labored                   Heart:   RRR                    Abdomen: soft       Extremities:  Prior exam done in office has been consistent with end-stage knee arthritis. The patient has noted pain with ROM of the left knee. Trace effusion. Crepitus   present. Distally the patient shows no neurologic deficit. Antalgic gait appreciated. Meds:   No current facility-administered medications for this encounter. Current Outpatient Medications   Medication Sig    acetaminophen (TYLENOL) 500 mg tablet Take 2 Tablets by mouth every six (6) hours.  aspirin delayed-release 81 mg tablet Take 1 Tablet by mouth two (2) times a day.     cyclobenzaprine (FLEXERIL) 5 mg tablet Take 1 Tablet by mouth three (3) times daily as needed for Muscle Spasm(s).  gabapentin (NEURONTIN) 300 mg capsule Take 1 Capsule by mouth two (2) times a day.  HYDROmorphone (DILAUDID) 2 mg tablet Take 1 Tablet by mouth every four (4) hours as needed for Pain for up to 7 days. Max Daily Amount: 12 mg.    meloxicam (MOBIC) 15 mg tablet Take 1 Tablet by mouth daily.  omeprazole (PRILOSEC) 40 mg capsule Take 1 Capsule by mouth daily.  ondansetron (ZOFRAN ODT) 8 mg disintegrating tablet Take 1 Tablet by mouth every eight (8) hours as needed for Nausea or Vomiting.  senna-docusate (PERICOLACE) 8.6-50 mg per tablet Take 1 Tablet by mouth daily.  zolpidem (AMBIEN) 5 mg tablet Take 1 Tablet by mouth nightly as needed for Sleep. Max Daily Amount: 5 mg. Patient Active Problem List   Diagnosis Code    Chews tobacco regularly Z72.0    Class 1 obesity due to excess calories without serious comorbidity with body mass index (BMI) of 33.0 to 33.9 in adult E66.09, Z68.33    Chronic right shoulder pain M25.511, G89.29    Male hypogonadism E29.1    Elevated C-reactive protein (CRP) R79.82    Well adult exam Z00.00    Encounter for physical examination related to employment Z02.89    Chronic left-sided low back pain with left-sided sciatica M54.42, G89.29    Chronic left hip pain M25.552, G89.29    Chronic pain of left knee M25.562, G89.29    Snoring R06.83    Screening for diabetes mellitus Z13.1    Screening for lipid disorders Z13.220    Diminished libido R68.82    Lack of energy R53.83    Encounter for monitoring testosterone replacement therapy Z51.81, Z79.890    High risk medication use Z79.899    Long-term current use of testosterone replacement therapy Z79.890    Family history of colon cancer Z80.0       Assessment:   1. Arthritis of the Left knee    Plan:   The patient has failed previous conservative treatment for this condition including anti-inflammatories, injections and lifestyle modifications.  The necessity for joint replacement is present. Risks, benefits, alternatives and possible complications of left knee arthroplasty have been discussed with the patient including but not limited to potential for infection, bleeding, damage to nerves and/or blood vessels, stiffness of the knee after surgery, knee instability after surgery, patellar maltracking, potential for fracture both intra-op and post-op, polyethylene wear, implant loosening, and risk for revision surgery secondary to but not limited to these discussed risks. Further, we have discussed potential for venous thrombo-embolism including deep vein thrombosis and pulmonary embolism despite being on prophylaxis. Additionally, we have discussed potential for continued pain after surgery and patient has voiced understanding that I can make no guarantees regarding the pain relief of outcomes after surgery. We have also previously discussed the potential of morbidity and mortality associated with, but not limited to, the actual surgical procedure, anesthesia, prior medical conditions, and/or the administration of medications perioperatively. The patient has been given the opportunity to ask questions all of which have been answered and the patient wishes to proceed. The patient will be admitted the day of surgery for left total knee replacement. The patient was counseled at length about the risks of jeanne Covid-19 during their perioperative period and any recovery window from their procedure. The patient was made aware that jeanne Covid-19  may worsen their prognosis for recovering from their procedure and lend to a higher morbidity and/or mortality risk. All material risks, benefits, and reasonable alternatives including postponing the procedure were discussed. The patient does  wish to proceed with the procedure at this time.        Signed By: Logan Ramirez MD  January 5, 2022

## 2022-01-05 NOTE — ANESTHESIA POSTPROCEDURE EVALUATION
Procedure(s):  LEFT HIP ARTHROPLASTY TOTAL/ BOAZ.    spinal, general    Anesthesia Post Evaluation      Multimodal analgesia: multimodal analgesia used between 6 hours prior to anesthesia start to PACU discharge  Patient location during evaluation: PACU  Patient participation: complete - patient participated  Level of consciousness: awake and alert  Pain management: adequate  Airway patency: patent  Anesthetic complications: no  Cardiovascular status: acceptable  Respiratory status: acceptable  Hydration status: acceptable  Post anesthesia nausea and vomiting:  none      INITIAL Post-op Vital signs:   Vitals Value Taken Time   /65 01/05/22 1310   Temp 36.6 °C (97.8 °F) 01/05/22 1239   Pulse 64 01/05/22 1311   Resp 16 01/05/22 1310   SpO2 97 % 01/05/22 1311   Vitals shown include unvalidated device data.

## 2022-01-05 NOTE — PERIOP NOTES
TRANSFER - OUT REPORT:    Verbal report given to [facility] (name) on Karin Swenson  being transferred to [facility](unit) for routine post - op       Report consisted of patients Situation, Background, Assessment and   Recommendations(SBAR). Information from the following report(s) SBAR, Kardex, OR Summary, Procedure Summary, Intake/Output and MAR was reviewed with the receiving nurse. Lines:   Peripheral IV 01/05/22 Left;Posterior Hand (Active)   Site Assessment Clean, dry, & intact 01/05/22 1239   Phlebitis Assessment 0 01/05/22 1239   Infiltration Assessment 0 01/05/22 1239   Dressing Status Clean, dry, & intact; New 01/05/22 1239   Dressing Type Tape;Transparent 01/05/22 1239   Hub Color/Line Status Green; Infusing 01/05/22 1239   Alcohol Cap Used No 01/05/22 6111        Opportunity for questions and clarification was provided.       Patient transported with:   [employer]

## 2022-01-06 NOTE — PROGRESS NOTES
Has voided. Had therapy. Home therapy arranged. Has follow up appt scheduled. Prescriptions were sent to pharmacy, instructed how to take medications. Instructed to call doctor if having fever, excessive drainage or other problems. I have reviewed discharge instructions with the patient and spouse. The patient and spouse verbalized understanding. Going to car via wheelchair.

## 2022-01-07 ENCOUNTER — HOME CARE VISIT (OUTPATIENT)
Dept: SCHEDULING | Facility: HOME HEALTH | Age: 52
End: 2022-01-07
Payer: COMMERCIAL

## 2022-01-07 VITALS
SYSTOLIC BLOOD PRESSURE: 128 MMHG | RESPIRATION RATE: 14 BRPM | HEART RATE: 80 BPM | DIASTOLIC BLOOD PRESSURE: 78 MMHG | OXYGEN SATURATION: 97 % | TEMPERATURE: 97.5 F

## 2022-01-07 PROCEDURE — 400013 HH SOC

## 2022-01-07 PROCEDURE — G0151 HHCP-SERV OF PT,EA 15 MIN: HCPCS

## 2022-01-07 NOTE — HOME HEALTH
SITUATION. 46year old male patient s/p L CHRYSTAL 1/5/22 per Dr Angelique Gary. Hospitalized 1/5/22. History of OA, chronic R shoulder pain, hypertriglyceridemia, obesity, smokeless tobacco use   BACKGROUND. .Lives with wife. Home is single level with several steps to enter. Patient has a RW.   ASSESSMENT. [place] Crumble Macarena Crumble Patient alert and oriented with good participation during visit. Pt presents with L CHRYSTAL with aquacel dressing in place and intact with no drainage saturating through and no signs or symptoms of infection. Treatment: Instructed patient in infection control, taking medication as directed, use and application of CP, and signs and symptoms of DVT.  Instructed supine and seated CHRYSTAL HEP protocol 20xeach of AP, QS, GS, hip abd/add, HS, SAQ  Frequency: 1w1, 3w1, 2w2  Follow up: CHRYSTAL protocol

## 2022-01-07 NOTE — Clinical Note
SITUATION. 46year old male patient s/p L CHRYSTAL 1/5/22 per Dr Lizzette Daley. Hospitalized 1/5/22. History of OA, chronic R shoulder pain, hypertriglyceridemia, obesity, smokeless tobacco use   BACKGROUND. .Lives with wife. Home is single level with several steps to enter. Patient has a RW.   ASSESSMENT. Erwin Castro Patient alert and oriented with good participation during visit. Pt presents with L CHRYSTAL with aquacel dressing in place and intact with no drainage saturating through and no signs or symptoms of infection. Treatment: Instructed patient in infection control, taking medication as directed, use and application of CP, and signs and symptoms of DVT.  Instructed supine and seated CHRYSTAL HEP protocol 20xeach of AP, QS, GS, hip abd/add, HS, SAQ  Frequency: 1w1, 3w1, 2w2  Follow up: CHRYSTAL protocol

## 2022-01-11 ENCOUNTER — HOME CARE VISIT (OUTPATIENT)
Dept: SCHEDULING | Facility: HOME HEALTH | Age: 52
End: 2022-01-11
Payer: COMMERCIAL

## 2022-01-11 PROCEDURE — G0157 HHC PT ASSISTANT EA 15: HCPCS

## 2022-01-12 VITALS
HEART RATE: 90 BPM | RESPIRATION RATE: 16 BRPM | TEMPERATURE: 97.1 F | OXYGEN SATURATION: 98 % | DIASTOLIC BLOOD PRESSURE: 80 MMHG | SYSTOLIC BLOOD PRESSURE: 122 MMHG

## 2022-01-13 ENCOUNTER — HOME CARE VISIT (OUTPATIENT)
Dept: SCHEDULING | Facility: HOME HEALTH | Age: 52
End: 2022-01-13
Payer: COMMERCIAL

## 2022-01-13 PROCEDURE — G0157 HHC PT ASSISTANT EA 15: HCPCS

## 2022-01-14 ENCOUNTER — HOME CARE VISIT (OUTPATIENT)
Dept: SCHEDULING | Facility: HOME HEALTH | Age: 52
End: 2022-01-14
Payer: COMMERCIAL

## 2022-01-14 ENCOUNTER — HOME CARE VISIT (OUTPATIENT)
Dept: HOME HEALTH SERVICES | Facility: HOME HEALTH | Age: 52
End: 2022-01-14
Payer: COMMERCIAL

## 2022-01-14 VITALS
DIASTOLIC BLOOD PRESSURE: 70 MMHG | HEART RATE: 76 BPM | RESPIRATION RATE: 16 BRPM | SYSTOLIC BLOOD PRESSURE: 110 MMHG | OXYGEN SATURATION: 98 % | TEMPERATURE: 96.6 F

## 2022-01-14 VITALS
HEART RATE: 76 BPM | SYSTOLIC BLOOD PRESSURE: 130 MMHG | DIASTOLIC BLOOD PRESSURE: 84 MMHG | OXYGEN SATURATION: 96 % | TEMPERATURE: 98.2 F

## 2022-01-14 PROCEDURE — G0157 HHC PT ASSISTANT EA 15: HCPCS

## 2022-01-17 ENCOUNTER — HOME CARE VISIT (OUTPATIENT)
Dept: HOME HEALTH SERVICES | Facility: HOME HEALTH | Age: 52
End: 2022-01-17
Payer: COMMERCIAL

## 2022-01-19 ENCOUNTER — HOME CARE VISIT (OUTPATIENT)
Dept: SCHEDULING | Facility: HOME HEALTH | Age: 52
End: 2022-01-19
Payer: COMMERCIAL

## 2022-01-19 VITALS
OXYGEN SATURATION: 96 % | SYSTOLIC BLOOD PRESSURE: 126 MMHG | TEMPERATURE: 97.5 F | RESPIRATION RATE: 16 BRPM | DIASTOLIC BLOOD PRESSURE: 82 MMHG | HEART RATE: 62 BPM

## 2022-01-19 PROCEDURE — G0157 HHC PT ASSISTANT EA 15: HCPCS

## 2022-01-21 ENCOUNTER — HOME CARE VISIT (OUTPATIENT)
Dept: SCHEDULING | Facility: HOME HEALTH | Age: 52
End: 2022-01-21
Payer: COMMERCIAL

## 2022-01-21 PROCEDURE — G0157 HHC PT ASSISTANT EA 15: HCPCS

## 2022-01-23 VITALS
OXYGEN SATURATION: 99 % | SYSTOLIC BLOOD PRESSURE: 138 MMHG | HEART RATE: 76 BPM | RESPIRATION RATE: 14 BRPM | DIASTOLIC BLOOD PRESSURE: 82 MMHG | TEMPERATURE: 97.6 F

## 2022-01-24 ENCOUNTER — HOME CARE VISIT (OUTPATIENT)
Dept: SCHEDULING | Facility: HOME HEALTH | Age: 52
End: 2022-01-24
Payer: COMMERCIAL

## 2022-01-24 VITALS
OXYGEN SATURATION: 96 % | SYSTOLIC BLOOD PRESSURE: 134 MMHG | DIASTOLIC BLOOD PRESSURE: 80 MMHG | TEMPERATURE: 98 F | RESPIRATION RATE: 14 BRPM | HEART RATE: 78 BPM

## 2022-01-24 PROCEDURE — G0157 HHC PT ASSISTANT EA 15: HCPCS

## 2022-01-27 ENCOUNTER — HOME CARE VISIT (OUTPATIENT)
Dept: SCHEDULING | Facility: HOME HEALTH | Age: 52
End: 2022-01-27
Payer: COMMERCIAL

## 2022-01-27 VITALS
OXYGEN SATURATION: 97 % | RESPIRATION RATE: 14 BRPM | HEART RATE: 74 BPM | TEMPERATURE: 97.5 F | SYSTOLIC BLOOD PRESSURE: 116 MMHG | DIASTOLIC BLOOD PRESSURE: 78 MMHG

## 2022-01-27 PROCEDURE — G0151 HHCP-SERV OF PT,EA 15 MIN: HCPCS

## 2022-03-18 PROBLEM — M25.511 CHRONIC RIGHT SHOULDER PAIN: Status: ACTIVE | Noted: 2017-07-25

## 2022-03-18 PROBLEM — E66.811 CLASS 1 OBESITY DUE TO EXCESS CALORIES WITHOUT SERIOUS COMORBIDITY WITH BODY MASS INDEX (BMI) OF 33.0 TO 33.9 IN ADULT: Status: ACTIVE | Noted: 2017-07-25

## 2022-03-18 PROBLEM — G89.29 CHRONIC LEFT HIP PAIN: Status: ACTIVE | Noted: 2021-05-25

## 2022-03-18 PROBLEM — E66.09 CLASS 1 OBESITY DUE TO EXCESS CALORIES WITHOUT SERIOUS COMORBIDITY WITH BODY MASS INDEX (BMI) OF 33.0 TO 33.9 IN ADULT: Status: ACTIVE | Noted: 2017-07-25

## 2022-03-18 PROBLEM — R79.82 ELEVATED C-REACTIVE PROTEIN (CRP): Status: ACTIVE | Noted: 2018-07-23

## 2022-03-18 PROBLEM — Z51.81 ENCOUNTER FOR MONITORING TESTOSTERONE REPLACEMENT THERAPY: Status: ACTIVE | Noted: 2021-06-09

## 2022-03-18 PROBLEM — M25.552 CHRONIC LEFT HIP PAIN: Status: ACTIVE | Noted: 2021-05-25

## 2022-03-18 PROBLEM — Z79.890 ENCOUNTER FOR MONITORING TESTOSTERONE REPLACEMENT THERAPY: Status: ACTIVE | Noted: 2021-06-09

## 2022-03-18 PROBLEM — G89.29 CHRONIC RIGHT SHOULDER PAIN: Status: ACTIVE | Noted: 2017-07-25

## 2022-03-18 PROBLEM — Z02.89 ENCOUNTER FOR PHYSICAL EXAMINATION RELATED TO EMPLOYMENT: Status: ACTIVE | Noted: 2021-05-25

## 2022-03-18 PROBLEM — Z00.00 WELL ADULT EXAM: Status: ACTIVE | Noted: 2021-05-25

## 2022-03-19 PROBLEM — M54.42 CHRONIC LEFT-SIDED LOW BACK PAIN WITH LEFT-SIDED SCIATICA: Status: ACTIVE | Noted: 2021-05-25

## 2022-03-19 PROBLEM — R68.82 DIMINISHED LIBIDO: Status: ACTIVE | Noted: 2021-06-09

## 2022-03-19 PROBLEM — Z72.0 CHEWS TOBACCO REGULARLY: Status: ACTIVE | Noted: 2017-07-25

## 2022-03-19 PROBLEM — M25.562 CHRONIC PAIN OF LEFT KNEE: Status: ACTIVE | Noted: 2021-05-25

## 2022-03-19 PROBLEM — Z79.890 LONG-TERM CURRENT USE OF TESTOSTERONE REPLACEMENT THERAPY: Status: ACTIVE | Noted: 2021-06-20

## 2022-03-19 PROBLEM — Z13.220 SCREENING FOR LIPID DISORDERS: Status: ACTIVE | Noted: 2021-05-25

## 2022-03-19 PROBLEM — G89.29 CHRONIC PAIN OF LEFT KNEE: Status: ACTIVE | Noted: 2021-05-25

## 2022-03-19 PROBLEM — R53.83 LACK OF ENERGY: Status: ACTIVE | Noted: 2021-06-09

## 2022-03-19 PROBLEM — E29.1 MALE HYPOGONADISM: Status: ACTIVE | Noted: 2018-07-21

## 2022-03-19 PROBLEM — Z79.899 HIGH RISK MEDICATION USE: Status: ACTIVE | Noted: 2021-06-09

## 2022-03-19 PROBLEM — R06.83 SNORING: Status: ACTIVE | Noted: 2021-05-25

## 2022-03-19 PROBLEM — Z80.0 FAMILY HISTORY OF COLON CANCER: Status: ACTIVE | Noted: 2021-08-03

## 2022-03-19 PROBLEM — G89.29 CHRONIC LEFT-SIDED LOW BACK PAIN WITH LEFT-SIDED SCIATICA: Status: ACTIVE | Noted: 2021-05-25

## 2022-03-19 PROBLEM — M16.12 OSTEOARTHRITIS OF LEFT HIP: Status: ACTIVE | Noted: 2022-01-05

## 2022-03-20 PROBLEM — Z13.1 SCREENING FOR DIABETES MELLITUS: Status: ACTIVE | Noted: 2021-05-25

## 2022-08-04 ENCOUNTER — OFFICE VISIT (OUTPATIENT)
Dept: FAMILY MEDICINE CLINIC | Facility: CLINIC | Age: 52
End: 2022-08-04
Payer: COMMERCIAL

## 2022-08-04 VITALS
WEIGHT: 194 LBS | HEIGHT: 65 IN | DIASTOLIC BLOOD PRESSURE: 72 MMHG | BODY MASS INDEX: 32.32 KG/M2 | OXYGEN SATURATION: 98 % | HEART RATE: 78 BPM | SYSTOLIC BLOOD PRESSURE: 148 MMHG

## 2022-08-04 DIAGNOSIS — L98.9 SKIN LESION OF FACE: ICD-10-CM

## 2022-08-04 DIAGNOSIS — L98.9 SKIN LESION OF RIGHT ARM: ICD-10-CM

## 2022-08-04 DIAGNOSIS — Z00.00 WELL ADULT EXAM: Primary | ICD-10-CM

## 2022-08-04 DIAGNOSIS — E66.09 CLASS 1 OBESITY DUE TO EXCESS CALORIES WITHOUT SERIOUS COMORBIDITY WITH BODY MASS INDEX (BMI) OF 32.0 TO 32.9 IN ADULT: ICD-10-CM

## 2022-08-04 DIAGNOSIS — K42.9 UMBILICAL HERNIA WITHOUT OBSTRUCTION OR GANGRENE: ICD-10-CM

## 2022-08-04 PROBLEM — Z51.81 ENCOUNTER FOR MONITORING TESTOSTERONE REPLACEMENT THERAPY: Chronic | Status: ACTIVE | Noted: 2021-06-09

## 2022-08-04 PROBLEM — Z51.81 ENCOUNTER FOR MONITORING TESTOSTERONE REPLACEMENT THERAPY: Status: ACTIVE | Noted: 2021-06-09

## 2022-08-04 PROBLEM — R68.82 DIMINISHED LIBIDO: Chronic | Status: ACTIVE | Noted: 2021-06-09

## 2022-08-04 PROBLEM — Z13.1 SCREENING FOR DIABETES MELLITUS: Chronic | Status: ACTIVE | Noted: 2021-05-25

## 2022-08-04 PROBLEM — E29.1 MALE HYPOGONADISM: Chronic | Status: ACTIVE | Noted: 2018-07-21

## 2022-08-04 PROBLEM — Z79.890 ENCOUNTER FOR MONITORING TESTOSTERONE REPLACEMENT THERAPY: Status: ACTIVE | Noted: 2021-06-09

## 2022-08-04 PROBLEM — E66.811 CLASS 1 OBESITY DUE TO EXCESS CALORIES WITHOUT SERIOUS COMORBIDITY WITH BODY MASS INDEX (BMI) OF 33.0 TO 33.9 IN ADULT: Chronic | Status: ACTIVE | Noted: 2017-07-25

## 2022-08-04 PROBLEM — Z79.899 HIGH RISK MEDICATION USE: Chronic | Status: ACTIVE | Noted: 2021-06-09

## 2022-08-04 PROBLEM — Z13.220 SCREENING FOR LIPID DISORDERS: Chronic | Status: ACTIVE | Noted: 2021-05-25

## 2022-08-04 PROBLEM — Z79.890 ENCOUNTER FOR MONITORING TESTOSTERONE REPLACEMENT THERAPY: Chronic | Status: ACTIVE | Noted: 2021-06-09

## 2022-08-04 PROCEDURE — 99396 PREV VISIT EST AGE 40-64: CPT | Performed by: FAMILY MEDICINE

## 2022-08-04 ASSESSMENT — PATIENT HEALTH QUESTIONNAIRE - PHQ9
1. LITTLE INTEREST OR PLEASURE IN DOING THINGS: 0
SUM OF ALL RESPONSES TO PHQ9 QUESTIONS 1 & 2: 0
SUM OF ALL RESPONSES TO PHQ QUESTIONS 1-9: 0
2. FEELING DOWN, DEPRESSED OR HOPELESS: 0
SUM OF ALL RESPONSES TO PHQ QUESTIONS 1-9: 0

## 2022-08-04 ASSESSMENT — ENCOUNTER SYMPTOMS
VOMITING: 0
ALLERGIC/IMMUNOLOGIC NEGATIVE: 1
DIARRHEA: 0
CONSTIPATION: 0
WHEEZING: 0
NAUSEA: 0
COUGH: 0
CHEST TIGHTNESS: 0
ABDOMINAL PAIN: 0
SHORTNESS OF BREATH: 0
EYES NEGATIVE: 1
BACK PAIN: 0

## 2022-08-04 NOTE — PROGRESS NOTES
Yang Dee DO                Diplomate of the [employer] of [facility] [place]         [phone]    Shakira Heck is a 46 y.o. male who was seen on 8/4/2022 for   Chief Complaint   Patient presents with    Referral - General     Derm for right arm and face    Hernia     Possible hernia          Assessment & Plan     Diagnosis Orders   1. Well adult exam      8/4/2022      2. Class 1 obesity due to excess calories without serious comorbidity with body mass index (BMI) of 32.0 to 32.9 in adult      Improving. Low carb diet advised/reviewed. Information given. 3. Umbilical hernia without obstruction or gangrene  [facility] - Bee Dang MD, General Surgery, [facility]    Referral to Dr. Jeffry Bennett      4. Skin lesion of right arm      Referral to dermatology      5. Skin lesion of face  [facility] - Luisa Holder MD, [place]    Referral to dermatology          No problem-specific Assessment & Plan notes found for this encounter. Follow-up and Dispositions    Return in about 1 year (around 8/5/2023) for NO SAMUEL. RECENT LABS/TESTS TO REVIEW and DISCUSS    No results found for this visit on 08/04/22. Subjective    HPI: Please see my assessment and plan notes (above) for individual problems addressed today. Other important information: This is a 68-year-old male patient who is here today for complete physical exam.  He had lab work with the [employer] this year which was reviewed. The patient is requesting a referral to dermatology for skin lesions on his right arm and face. I have placed a referral to Dr. Luisa Holder. Has noted a small umbilical hernia which is reducible but is sometimes tender. I will refer him to Dr. Saravanan Bailey for further evaluation. Reviewed and updated this visit by provider:           Review of Systems   Constitutional:  Negative for fatigue and fever.    HENT: Negative. Eyes: Negative. Respiratory:  Negative for cough, chest tightness, shortness of breath and wheezing. Cardiovascular:  Negative for chest pain and leg swelling. Gastrointestinal:  Negative for abdominal pain, constipation, diarrhea, nausea and vomiting. Endocrine: Negative. Genitourinary:  Negative for difficulty urinating, dysuria and frequency. Musculoskeletal:  Negative for arthralgias, back pain and neck pain. Skin:  Negative for rash. Allergic/Immunologic: Negative. Neurological:  Negative for dizziness and light-headedness. Psychiatric/Behavioral:  Negative for dysphoric mood. The patient is not nervous/anxious. All other systems reviewed and are negative. Objective    BP (!) 148/72 (Site: Left Upper Arm, Position: Sitting, Cuff Size: Medium Adult)   Pulse 78   Ht 5' 4.5\" (1.638 m)   Wt 194 lb (88 kg)   SpO2 98%   BMI 32.79 kg/m²     Physical Exam  Vitals reviewed. Constitutional:       General: He is not in acute distress. Appearance: Normal appearance. He is well-developed and well-groomed. HENT:      Head: Normocephalic and atraumatic. Right Ear: Tympanic membrane, ear canal and external ear normal.      Left Ear: Tympanic membrane, ear canal and external ear normal.      Mouth/Throat:      Mouth: Mucous membranes are moist.   Eyes:      General: Lids are normal.      Extraocular Movements: Extraocular movements intact. Conjunctiva/sclera: Conjunctivae normal.      Pupils: Pupils are equal, round, and reactive to light. Neck:      Vascular: No carotid bruit. Cardiovascular:      Rate and Rhythm: Normal rate and regular rhythm. Heart sounds: Normal heart sounds. Pulmonary:      Effort: Pulmonary effort is normal.      Breath sounds: Normal breath sounds. Abdominal:      General: Bowel sounds are normal.      Palpations: Abdomen is soft. There is no hepatomegaly, splenomegaly or mass.       Tenderness: no abdominal tenderness Hernia: A hernia is present. Hernia is present in the umbilical area (small, mild tenderness). There is no hernia in the left inguinal area or right inguinal area. Genitourinary:     Penis: Normal.       Testes: Normal.      Epididymis:      Right: Normal.      Left: Normal.   Musculoskeletal:      Cervical back: Neck supple. No rigidity or tenderness. Lymphadenopathy:      Cervical: No cervical adenopathy. Skin:     General: Skin is warm and dry. Neurological:      General: No focal deficit present. Mental Status: He is alert and oriented to person, place, and time. Cranial Nerves: Cranial nerves are intact. Sensory: Sensation is intact. Motor: Motor function is intact. Deep Tendon Reflexes: Reflexes are normal and symmetric. Psychiatric:         Attention and Perception: Attention and perception normal.         Mood and Affect: Mood and affect normal.         Speech: Speech normal.         Behavior: Behavior normal. Behavior is cooperative. Thought Content: Thought content normal.         Cognition and Memory: Cognition and memory normal.         Judgment: Judgment normal.       On this date 08/04/2022 I have spent 31 minutes reviewing previous notes, lab/imaging results and face to face with the patient discussing the diagnoses and importance of compliance with the treatment plan, as well as documenting on the day of the visit.         Greg Billings DO  [facility] [place]

## 2022-08-15 PROBLEM — E66.811 CLASS 1 OBESITY DUE TO EXCESS CALORIES WITHOUT SERIOUS COMORBIDITY WITH BODY MASS INDEX (BMI) OF 32.0 TO 32.9 IN ADULT: Status: ACTIVE | Noted: 2017-07-25

## 2022-08-15 PROBLEM — K42.9 UMBILICAL HERNIA WITHOUT OBSTRUCTION OR GANGRENE: Status: ACTIVE | Noted: 2022-08-15

## 2022-08-15 PROBLEM — E66.09 CLASS 1 OBESITY DUE TO EXCESS CALORIES WITHOUT SERIOUS COMORBIDITY WITH BODY MASS INDEX (BMI) OF 32.0 TO 32.9 IN ADULT: Status: ACTIVE | Noted: 2017-07-25

## 2022-08-18 ENCOUNTER — OFFICE VISIT (OUTPATIENT)
Dept: SURGERY | Age: 52
End: 2022-08-18
Payer: COMMERCIAL

## 2022-08-18 VITALS
HEART RATE: 76 BPM | DIASTOLIC BLOOD PRESSURE: 82 MMHG | WEIGHT: 201 LBS | SYSTOLIC BLOOD PRESSURE: 131 MMHG | BODY MASS INDEX: 33.97 KG/M2

## 2022-08-18 DIAGNOSIS — K42.9 UMBILICAL HERNIA WITHOUT OBSTRUCTION AND WITHOUT GANGRENE: Primary | ICD-10-CM

## 2022-08-18 PROCEDURE — 99204 OFFICE O/P NEW MOD 45 MIN: CPT | Performed by: SURGERY

## 2022-08-18 ASSESSMENT — ENCOUNTER SYMPTOMS
SHORTNESS OF BREATH: 0
EYE ITCHING: 0
ANAL BLEEDING: 0
ABDOMINAL PAIN: 0
EYE REDNESS: 0
BACK PAIN: 0
BLOOD IN STOOL: 0
COLOR CHANGE: 0
EYE PAIN: 0
WHEEZING: 0
COUGH: 0

## 2022-08-18 NOTE — PROGRESS NOTES
Omi Garcia MD, [facility], [address] [place] [address]  Gladis Atkinson  Phone [phone]   Fax [phone]      Date of visit: 8/22/2022     Primary/Requesting provider: Robert Solo DO    Chief Complaint   Patient presents with    New Patient     NP Umbilical hernia         Patient is a 46 y.o. male who presents for evaluation of a hernia; he is known to the group from [place] last year by Dr Doug Sprague.  He reports a 1.5-2year history of the hernia. He notes definite enlargement recently. His only symptom is mild (1/10) soreness, which seems to associated with bloating after eating. The hernia does spontaneously reduce at night/when supine. He is not experiencing n/v, alteration of baseline bowel/bladder function. He has not had any prior abdominal surgery. Medications:   Current Outpatient Medications on File Prior to Visit   Medication Sig Dispense Refill    acetaminophen (TYLENOL) 500 MG tablet Take 1,000 mg by mouth every 6 hours       No current facility-administered medications on file prior to visit. Allergies: No Known Allergies     Past History:  Past Medical History:   Diagnosis Date    Chronic right shoulder pain     Hypertriglyceridemia 05/25/2021    no meds     Obesity (BMI 30.0-34. 9)     Smokeless tobacco use       Past Surgical History:   Procedure Laterality Date    COLONOSCOPY N/A 8/3/2021    COLONOSCOPY/BMI 35 performed by Tk Pak DO at [facility] 01/05/2022    WISDOM TOOTH EXTRACTION          Family and Social History:  Family History   Problem Relation Age of Onset    Diabetes Father     Cancer Father         prostate    COPD Mother      Social History     Socioeconomic History    Marital status:      Spouse name: Not on file    Number of children: Not on file    Years of education: Not on file    Highest education level: Not on file   Occupational History    Not on file   Tobacco Use    Smoking status: Never    Smokeless tobacco: Current    Tobacco comments:     Quit smoking: dip   Substance and Sexual Activity    Alcohol use: Yes     Comment: 1-2 beer a day    Drug use: No    Sexual activity: Not on file   Other Topics Concern    Not on file   Social History Narrative    Not on file     Social Determinants of Health     Financial Resource Strain: Not on file   Food Insecurity: Not on file   Transportation Needs: Not on file   Physical Activity: Not on file   Stress: Not on file   Social Connections: Not on file   Intimate Partner Violence: Not on file   Housing Stability: Not on file           Review of Systems   Constitutional:  Negative for appetite change, chills and fever. HENT:  Negative for congestion, drooling and ear pain. Eyes:  Negative for pain, redness and itching. Respiratory:  Negative for cough, shortness of breath and wheezing. Cardiovascular:  Negative for chest pain, palpitations and leg swelling. Gastrointestinal:  Negative for abdominal pain, anal bleeding and blood in stool. Endocrine: Negative for cold intolerance, heat intolerance and polydipsia. Genitourinary:  Negative for difficulty urinating, frequency and urgency. Musculoskeletal:  Negative for back pain, joint swelling and neck pain. Skin:  Negative for color change, rash and wound. Allergic/Immunologic: Negative for environmental allergies, food allergies and immunocompromised state. Neurological:  Negative for dizziness, seizures and numbness. Hematological:  Negative for adenopathy. Does not bruise/bleed easily. Psychiatric/Behavioral:  Negative for confusion and hallucinations. The patient is not nervous/anxious. Physical Exam  Vitals and nursing note reviewed. Constitutional:       Appearance: He is obese. HENT:      Head: Normocephalic and atraumatic. Eyes:      General: No scleral icterus. Pupils: Pupils are equal, round, and reactive to light.    Cardiovascular:      Rate and Rhythm: Normal rate. Pulmonary:      Effort: Pulmonary effort is normal. No respiratory distress. Breath sounds: No stridor. Abdominal:      General: Bowel sounds are normal. There is no distension. Palpations: Abdomen is soft. Tenderness: There is no abdominal tenderness. There is no guarding. Hernia: A hernia (fat-containing, reducible umbilical hernia. defect est 1.5cm) is present. Neurological:      General: No focal deficit present. Mental Status: He is alert and oriented to person, place, and time. Cranial Nerves: No cranial nerve deficit. Psychiatric:         Mood and Affect: Mood normal.         Behavior: Behavior normal.         Thought Content: Thought content normal.         Judgment: Judgment normal.           ASSESSMENT and PLAN:    1. Umbilical hernia without obstruction and without gangrene         Discussed natural history of umbilical hernias. Given small defect size currently, his near-term risk of emergency event is low. Repair is appropriate in light of discomfort but is not urgent. Will proceed with  umbilical hernia repair with mesh. Technical details of the procedure are reviewed. Risks reviewed include risks of anesthesia, bleeding, infection, visceral injury, persistent post-operative pain, and hernia recurrence. All questions are answered.

## 2022-09-03 PROBLEM — Z00.00 WELL ADULT EXAM: Chronic | Status: RESOLVED | Noted: 2021-05-25 | Resolved: 2022-09-03

## 2022-09-03 PROBLEM — Z13.1 SCREENING FOR DIABETES MELLITUS: Chronic | Status: RESOLVED | Noted: 2021-05-25 | Resolved: 2022-09-03

## 2022-09-03 PROBLEM — Z13.220 SCREENING FOR LIPID DISORDERS: Chronic | Status: RESOLVED | Noted: 2021-05-25 | Resolved: 2022-09-03

## 2022-09-08 ENCOUNTER — TELEPHONE (OUTPATIENT)
Dept: SURGERY | Age: 52
End: 2022-09-08

## 2022-09-16 ENCOUNTER — TELEPHONE (OUTPATIENT)
Dept: SURGERY | Age: 52
End: 2022-09-16

## 2022-09-20 ENCOUNTER — PREP FOR PROCEDURE (OUTPATIENT)
Dept: SURGERY | Age: 52
End: 2022-09-20

## 2022-10-13 ENCOUNTER — TELEPHONE (OUTPATIENT)
Dept: ORTHOPEDIC SURGERY | Age: 52
End: 2022-10-13

## 2022-10-20 ENCOUNTER — TELEPHONE (OUTPATIENT)
Dept: ORTHOPEDIC SURGERY | Age: 52
End: 2022-10-20

## 2022-11-30 NOTE — PERIOP NOTE
Patient verified name and . Order for consent is found in EHR and matches case posting; patient verifies procedure. Type 1b surgery, Phone assessment complete. Orders were received. Labs per surgeon: none  Labs per anesthesia protocol: none    Patient answered medical/surgical history questions at their best of ability. All prior to admission medications documented in [facility]. Patient instructed to take the following medications the day of surgery according to anesthesia guidelines with a small sip of water: none   Hold all vitamins 7 days prior to surgery and NSAIDS 5 days prior to surgery. Prescription meds to hold:none  Patient instructed on the following:    > Arrive at [facility], time of arrival to be called the day before by 1700  > NPO after midnight, unless otherwise indicated, including gum, mints, and ice chips  > Responsible adult must drive patient to the hospital, stay during surgery, and patient will need supervision 24 hours after anesthesia  > Use antibacterial soap in shower the night before surgery and on the morning of surgery  > All piercings must be removed prior to arrival.    > Leave all valuables (money and jewelry) at home but bring insurance card and ID on DOS.   > Do not wear make-up, nail polish, lotions, cologne, perfumes, powders, or oil on skin. Artificial nails are not permitted.

## 2022-12-04 ENCOUNTER — ANESTHESIA EVENT (OUTPATIENT)
Dept: SURGERY | Age: 52
End: 2022-12-04
Payer: COMMERCIAL

## 2022-12-05 ENCOUNTER — ANESTHESIA (OUTPATIENT)
Dept: SURGERY | Age: 52
End: 2022-12-05
Payer: COMMERCIAL

## 2022-12-05 ENCOUNTER — HOSPITAL ENCOUNTER (OUTPATIENT)
Age: 52
Setting detail: OUTPATIENT SURGERY
Discharge: HOME | End: 2022-12-05
Attending: SURGERY | Admitting: SURGERY
Payer: COMMERCIAL

## 2022-12-05 VITALS
WEIGHT: 196 LBS | HEART RATE: 67 BPM | SYSTOLIC BLOOD PRESSURE: 138 MMHG | TEMPERATURE: 97.7 F | HEIGHT: 65 IN | RESPIRATION RATE: 16 BRPM | BODY MASS INDEX: 32.65 KG/M2 | OXYGEN SATURATION: 97 % | DIASTOLIC BLOOD PRESSURE: 72 MMHG

## 2022-12-05 DIAGNOSIS — K42.9 UMBILICAL HERNIA WITHOUT OBSTRUCTION OR GANGRENE: Primary | ICD-10-CM

## 2022-12-05 PROCEDURE — 2709999900 HC NON-CHARGEABLE SUPPLY: Performed by: SURGERY

## 2022-12-05 PROCEDURE — 3600000003 HC SURGERY LEVEL 3 BASE: Performed by: SURGERY

## 2022-12-05 PROCEDURE — 7100000001 HC PACU RECOVERY - ADDTL 15 MIN: Performed by: SURGERY

## 2022-12-05 PROCEDURE — 6360000002 HC RX W HCPCS: Performed by: ANESTHESIOLOGY

## 2022-12-05 PROCEDURE — 6360000002 HC RX W HCPCS: Performed by: NURSE ANESTHETIST, CERTIFIED REGISTERED

## 2022-12-05 PROCEDURE — C1781 MESH (IMPLANTABLE): HCPCS | Performed by: SURGERY

## 2022-12-05 PROCEDURE — 2500000003 HC RX 250 WO HCPCS: Performed by: SURGERY

## 2022-12-05 PROCEDURE — 6360000002 HC RX W HCPCS: Performed by: SURGERY

## 2022-12-05 PROCEDURE — 7100000010 HC PHASE II RECOVERY - FIRST 15 MIN: Performed by: SURGERY

## 2022-12-05 PROCEDURE — 2500000003 HC RX 250 WO HCPCS: Performed by: NURSE ANESTHETIST, CERTIFIED REGISTERED

## 2022-12-05 PROCEDURE — 6370000000 HC RX 637 (ALT 250 FOR IP): Performed by: ANESTHESIOLOGY

## 2022-12-05 PROCEDURE — 7100000011 HC PHASE II RECOVERY - ADDTL 15 MIN: Performed by: SURGERY

## 2022-12-05 PROCEDURE — 2580000003 HC RX 258: Performed by: ANESTHESIOLOGY

## 2022-12-05 PROCEDURE — 3700000001 HC ADD 15 MINUTES (ANESTHESIA): Performed by: SURGERY

## 2022-12-05 PROCEDURE — 7100000000 HC PACU RECOVERY - FIRST 15 MIN: Performed by: SURGERY

## 2022-12-05 PROCEDURE — 3600000013 HC SURGERY LEVEL 3 ADDTL 15MIN: Performed by: SURGERY

## 2022-12-05 PROCEDURE — 3700000000 HC ANESTHESIA ATTENDED CARE: Performed by: SURGERY

## 2022-12-05 PROCEDURE — 49585 REPAIR UMBILICAL HERN,5+Y/O,REDUC: CPT | Performed by: SURGERY

## 2022-12-05 DEVICE — IMPLANTABLE DEVICE: Type: IMPLANTABLE DEVICE | Site: UMBILICAL | Status: FUNCTIONAL

## 2022-12-05 RX ORDER — BUPIVACAINE HYDROCHLORIDE AND EPINEPHRINE 5; 5 MG/ML; UG/ML
INJECTION, SOLUTION EPIDURAL; INTRACAUDAL; PERINEURAL PRN
Status: DISCONTINUED | OUTPATIENT
Start: 2022-12-05 | End: 2022-12-05 | Stop reason: HOSPADM

## 2022-12-05 RX ORDER — MIDAZOLAM HYDROCHLORIDE 2 MG/2ML
2 INJECTION, SOLUTION INTRAMUSCULAR; INTRAVENOUS
Status: COMPLETED | OUTPATIENT
Start: 2022-12-05 | End: 2022-12-05

## 2022-12-05 RX ORDER — LIDOCAINE HYDROCHLORIDE 20 MG/ML
INJECTION, SOLUTION EPIDURAL; INFILTRATION; INTRACAUDAL; PERINEURAL PRN
Status: DISCONTINUED | OUTPATIENT
Start: 2022-12-05 | End: 2022-12-05 | Stop reason: SDUPTHER

## 2022-12-05 RX ORDER — HYDRALAZINE HYDROCHLORIDE 20 MG/ML
10 INJECTION INTRAMUSCULAR; INTRAVENOUS
Status: DISCONTINUED | OUTPATIENT
Start: 2022-12-05 | End: 2022-12-05 | Stop reason: HOSPADM

## 2022-12-05 RX ORDER — PROPOFOL 10 MG/ML
INJECTION, EMULSION INTRAVENOUS PRN
Status: DISCONTINUED | OUTPATIENT
Start: 2022-12-05 | End: 2022-12-05 | Stop reason: SDUPTHER

## 2022-12-05 RX ORDER — PROCHLORPERAZINE EDISYLATE 5 MG/ML
5 INJECTION INTRAMUSCULAR; INTRAVENOUS
Status: DISCONTINUED | OUTPATIENT
Start: 2022-12-05 | End: 2022-12-05 | Stop reason: HOSPADM

## 2022-12-05 RX ORDER — ONDANSETRON 2 MG/ML
4 INJECTION INTRAMUSCULAR; INTRAVENOUS
Status: DISCONTINUED | OUTPATIENT
Start: 2022-12-05 | End: 2022-12-05 | Stop reason: HOSPADM

## 2022-12-05 RX ORDER — OXYCODONE HYDROCHLORIDE 5 MG/1
5 TABLET ORAL
Status: COMPLETED | OUTPATIENT
Start: 2022-12-05 | End: 2022-12-05

## 2022-12-05 RX ORDER — DEXAMETHASONE SODIUM PHOSPHATE 10 MG/ML
INJECTION INTRAMUSCULAR; INTRAVENOUS PRN
Status: DISCONTINUED | OUTPATIENT
Start: 2022-12-05 | End: 2022-12-05 | Stop reason: SDUPTHER

## 2022-12-05 RX ORDER — LIDOCAINE HYDROCHLORIDE 10 MG/ML
1 INJECTION, SOLUTION INFILTRATION; PERINEURAL
Status: DISCONTINUED | OUTPATIENT
Start: 2022-12-05 | End: 2022-12-05 | Stop reason: HOSPADM

## 2022-12-05 RX ORDER — HYDROMORPHONE HYDROCHLORIDE 1 MG/ML
0.5 INJECTION, SOLUTION INTRAMUSCULAR; INTRAVENOUS; SUBCUTANEOUS EVERY 5 MIN PRN
Status: DISCONTINUED | OUTPATIENT
Start: 2022-12-05 | End: 2022-12-05 | Stop reason: HOSPADM

## 2022-12-05 RX ORDER — LABETALOL HYDROCHLORIDE 5 MG/ML
10 INJECTION, SOLUTION INTRAVENOUS
Status: DISCONTINUED | OUTPATIENT
Start: 2022-12-05 | End: 2022-12-05 | Stop reason: HOSPADM

## 2022-12-05 RX ORDER — ACETAMINOPHEN 500 MG
1000 TABLET ORAL ONCE
Status: COMPLETED | OUTPATIENT
Start: 2022-12-05 | End: 2022-12-05

## 2022-12-05 RX ORDER — HYDROCODONE BITARTRATE AND ACETAMINOPHEN 5; 325 MG/1; MG/1
1 TABLET ORAL EVERY 4 HOURS PRN
Qty: 20 TABLET | Refills: 0 | Status: SHIPPED | OUTPATIENT
Start: 2022-12-05 | End: 2022-12-10

## 2022-12-05 RX ORDER — SODIUM CHLORIDE, SODIUM LACTATE, POTASSIUM CHLORIDE, CALCIUM CHLORIDE 600; 310; 30; 20 MG/100ML; MG/100ML; MG/100ML; MG/100ML
INJECTION, SOLUTION INTRAVENOUS CONTINUOUS
Status: DISCONTINUED | OUTPATIENT
Start: 2022-12-05 | End: 2022-12-05 | Stop reason: HOSPADM

## 2022-12-05 RX ORDER — HYDROMORPHONE HCL 110MG/55ML
PATIENT CONTROLLED ANALGESIA SYRINGE INTRAVENOUS PRN
Status: DISCONTINUED | OUTPATIENT
Start: 2022-12-05 | End: 2022-12-05 | Stop reason: SDUPTHER

## 2022-12-05 RX ORDER — FENTANYL CITRATE 50 UG/ML
100 INJECTION, SOLUTION INTRAMUSCULAR; INTRAVENOUS
Status: DISCONTINUED | OUTPATIENT
Start: 2022-12-05 | End: 2022-12-05 | Stop reason: HOSPADM

## 2022-12-05 RX ORDER — ONDANSETRON 2 MG/ML
INJECTION INTRAMUSCULAR; INTRAVENOUS PRN
Status: DISCONTINUED | OUTPATIENT
Start: 2022-12-05 | End: 2022-12-05 | Stop reason: SDUPTHER

## 2022-12-05 RX ORDER — HYDROMORPHONE HYDROCHLORIDE 1 MG/ML
0.25 INJECTION, SOLUTION INTRAMUSCULAR; INTRAVENOUS; SUBCUTANEOUS EVERY 5 MIN PRN
Status: DISCONTINUED | OUTPATIENT
Start: 2022-12-05 | End: 2022-12-05 | Stop reason: HOSPADM

## 2022-12-05 RX ADMIN — HYDROMORPHONE HYDROCHLORIDE 0.5 MG: 2 INJECTION INTRAMUSCULAR; INTRAVENOUS; SUBCUTANEOUS at 08:09

## 2022-12-05 RX ADMIN — LIDOCAINE HYDROCHLORIDE 80 MG: 20 INJECTION, SOLUTION EPIDURAL; INFILTRATION; INTRACAUDAL; PERINEURAL at 07:53

## 2022-12-05 RX ADMIN — Medication 2000 MG: at 07:58

## 2022-12-05 RX ADMIN — PROPOFOL 200 MG: 10 INJECTION, EMULSION INTRAVENOUS at 07:53

## 2022-12-05 RX ADMIN — ACETAMINOPHEN 1000 MG: 500 TABLET, FILM COATED ORAL at 06:48

## 2022-12-05 RX ADMIN — SODIUM CHLORIDE, POTASSIUM CHLORIDE, SODIUM LACTATE AND CALCIUM CHLORIDE: 600; 310; 30; 20 INJECTION, SOLUTION INTRAVENOUS at 06:55

## 2022-12-05 RX ADMIN — MIDAZOLAM 2 MG: 1 INJECTION INTRAMUSCULAR; INTRAVENOUS at 07:38

## 2022-12-05 RX ADMIN — HYDROMORPHONE HYDROCHLORIDE 0.5 MG: 2 INJECTION INTRAMUSCULAR; INTRAVENOUS; SUBCUTANEOUS at 07:57

## 2022-12-05 RX ADMIN — DEXAMETHASONE SODIUM PHOSPHATE 10 MG: 10 INJECTION INTRAMUSCULAR; INTRAVENOUS at 08:04

## 2022-12-05 RX ADMIN — OXYCODONE 5 MG: 5 TABLET ORAL at 09:19

## 2022-12-05 RX ADMIN — ONDANSETRON 4 MG: 2 INJECTION INTRAMUSCULAR; INTRAVENOUS at 08:07

## 2022-12-05 ASSESSMENT — PAIN DESCRIPTION - LOCATION: LOCATION: ABDOMEN

## 2022-12-05 ASSESSMENT — PAIN SCALES - GENERAL: PAINLEVEL_OUTOF10: 4

## 2022-12-05 NOTE — PERIOP NOTE
Nurse to call MD chauhan at this time to see if MD wants to come see patient at bedside to signout for DC. Bernard Gunter states on phone call that it is ok to DC at this time if patient is OK and stable. Plan of care continues.

## 2022-12-05 NOTE — ANESTHESIA POSTPROCEDURE EVALUATION
Department of Anesthesiology  Postprocedure Note    Patient: Rhona Vang  MRN: [id number]  YOB: 1970  Date of evaluation: 12/5/2022      Procedure Summary     Date: 12/05/22 Room / Location: [facility] [place] [place] MAIN OR 03 / [facility] [place] [place]IN OR    Anesthesia Start: 4397 Anesthesia Stop: 1623    Procedure: UMBILICAL HERNIA REPAIR W/ MESH (Abdomen) Diagnosis:       Umbilical hernia without obstruction and without gangrene      (Umbilical hernia without obstruction and without gangrene [K42.9])    Surgeons: Tra Hines MD Responsible Provider: Jemma Garcia DO    Anesthesia Type: general ASA Status: 2          Anesthesia Type: No value filed.     Jemma Phase I: Jemma Score: 10    Jemma Phase II:        Anesthesia Post Evaluation    Patient location during evaluation: PACU  Level of consciousness: awake and alert  Airway patency: patent  Nausea & Vomiting: no nausea  Complications: no  Cardiovascular status: hemodynamically stable  Respiratory status: acceptable  Hydration status: euvolemic

## 2022-12-05 NOTE — ANESTHESIA PRE PROCEDURE
Department of Anesthesiology  Preprocedure Note       Name:  Cinthia Mullins   Age:  46 y.o.  :  1970                                          MRN:  [id number]         Date:  2022      Surgeon: Abi Pedraza):  Anselmo Dominguez MD    Procedure: Procedure(s): UMBILICAL HERNIA REPAIR W/ MESH    Medications prior to admission:   Prior to Admission medications    Medication Sig Start Date End Date Taking?  Authorizing Provider   acetaminophen (TYLENOL) 500 MG tablet Take 1,000 mg by mouth every 6 hours 21   Ar Automatic Reconciliation       Current medications:    Current Facility-Administered Medications   Medication Dose Route Frequency Provider Last Rate Last Admin    ceFAZolin (ANCEF) 2000 mg in sterile water 20 mL IV syringe  2,000 mg IntraVENous On Call Anselmo Dominguez MD        lidocaine 1 % injection 1 mL  1 mL IntraDERmal Once PRN Huyen Ching,         acetaminophen (TYLENOL) tablet 1,000 mg  1,000 mg Oral Once Huyen Ching,         fentaNYL (SUBLIMAZE) injection 100 mcg  100 mcg IntraVENous Once PRN Huyen Ching,         lactated ringers infusion   IntraVENous Continuous Huyen Maldonado,         midazolam PF (VERSED) injection 2 mg  2 mg IntraVENous Once PRN Huyen Ching, DO           Allergies:  No Known Allergies    Problem List:    Patient Active Problem List   Diagnosis Code    Encounter for monitoring testosterone replacement therapy Z51.81, Z79.890    Encounter for physical examination related to employment Z02.89    Class 1 obesity due to excess calories without serious comorbidity with body mass index (BMI) of 32.0 to 32.9 in adult E66.09, Z68.32    Chronic right shoulder pain M25.511, G89.29    Chronic left hip pain M25.552, G89.29    Elevated C-reactive protein (CRP) R79.82    Chronic left-sided low back pain with left-sided sciatica M54.42, G89.29    Family history of colon cancer Z80.0    Osteoarthritis of left hip M16.12    Chews tobacco regularly Z72.0    Chronic pain of left knee M25.562, G89.29    Male hypogonadism E29.1    Snoring R06.83    Lack of energy R53.83    Long-term current use of testosterone replacement therapy Z79.890    Diminished libido E71.74    Umbilical hernia without obstruction or gangrene K42.9       Past Medical History:        Diagnosis Date    Chronic right shoulder pain     Hypertriglyceridemia 05/25/2021    no meds     Obesity (BMI 30.0-34. 9)     Smokeless tobacco use        Past Surgical History:        Procedure Laterality Date    COLONOSCOPY N/A 8/3/2021    COLONOSCOPY/BMI 33 performed by Judah Daley DO at [address] [place] [address] 01/05/2022    WISDOM TOOTH EXTRACTION         Social History:    Social History     Tobacco Use    Smoking status: Never    Smokeless tobacco: Current    Tobacco comments:     Quit smoking: dip   Substance Use Topics    Alcohol use: Yes     Comment: 1-2 beer a day                                Ready to quit: Not Answered  Counseling given: Not Answered  Tobacco comments: Quit smoking: dip      Vital Signs (Current):   Vitals:    11/30/22 1343 12/05/22 0600   BP:  (!) 155/93   Pulse:  72   Resp:  18   Temp:  97.8 °F (36.6 °C)   TempSrc:  Temporal   SpO2:  97%   Weight: 195 lb (88.5 kg) 196 lb (88.9 kg)   Height: 5' 4.5\" (1.638 m)                                               BP Readings from Last 3 Encounters:   12/05/22 (!) 155/93   08/18/22 131/82   08/04/22 (!) 148/72       NPO Status:                                                                                 BMI:   Wt Readings from Last 3 Encounters:   12/05/22 196 lb (88.9 kg)   08/18/22 201 lb (91.2 kg)   08/04/22 194 lb (88 kg)     Body mass index is 33.12 kg/m².     CBC:   Lab Results   Component Value Date/Time    WBC 6.1 12/13/2021 11:15 AM    RBC 4.64 12/13/2021 11:15 AM    HGB 15.0 12/13/2021 11:15 AM    HCT 44.0 12/13/2021 11:15 AM    MCV 94.8 12/13/2021 11:15 AM    RDW 11.9 12/13/2021 11:15 AM     12/13/2021 11:15 AM       CMP:   Lab Results   Component Value Date/Time     12/13/2021 11:15 AM    K 3.7 12/13/2021 11:15 AM     12/13/2021 11:15 AM    CO2 27 12/13/2021 11:15 AM    BUN 8 12/13/2021 11:15 AM    CREATININE 0.81 12/13/2021 11:15 AM    GFRAA >60 12/13/2021 11:15 AM    GLUCOSE 104 12/13/2021 11:15 AM    PROT 7.3 09/03/2021 10:08 AM    CALCIUM 8.9 12/13/2021 11:15 AM    BILITOT 0.5 09/03/2021 10:08 AM    ALKPHOS 98 09/03/2021 10:08 AM    AST 21 09/03/2021 10:08 AM    ALT 23 09/03/2021 10:08 AM       POC Tests: No results for input(s): POCGLU, POCNA, POCK, POCCL, POCBUN, POCHEMO, POCHCT in the last 72 hours.     Coags:   Lab Results   Component Value Date/Time    PROTIME 13.9 12/13/2021 11:15 AM    INR 1.0 12/13/2021 11:15 AM    APTT 28.0 12/13/2021 11:15 AM       HCG (If Applicable): No results found for: PREGTESTUR, PREGSERUM, HCG, HCGQUANT     ABGs: No results found for: PHART, PO2ART, IBJ0JIK, EOZ1RPR, BEART, E8NIBYSG     Type & Screen (If Applicable):  No results found for: LABABO, LABRH    Drug/Infectious Status (If Applicable):  No results found for: HIV, HEPCAB    COVID-19 Screening (If Applicable):   Lab Results   Component Value Date/Time    COVID19 Performed 01/03/2022 08:07 AM    COVID19 Not Detected 01/03/2022 08:07 AM           Anesthesia Evaluation  Patient summary reviewed and Nursing notes reviewed  Airway: Mallampati: II          Dental: normal exam         Pulmonary:Negative Pulmonary ROS and normal exam  breath sounds clear to auscultation                             Cardiovascular:Negative CV ROS  Exercise tolerance: good (>4 METS),           Rhythm: regular  Rate: normal                    Neuro/Psych:   (+) neuromuscular disease:, psychiatric history: stable with treatment            GI/Hepatic/Renal: Neg GI/Hepatic/Renal ROS            Endo/Other: Negative Endo/Other ROS                    Abdominal: Vascular: Other Findings:           Anesthesia Plan      general     ASA 2       Induction: intravenous. MIPS: Postoperative opioids intended and Prophylactic antiemetics administered. Anesthetic plan and risks discussed with patient.                         Sherly Ogden DO   12/5/2022

## 2022-12-05 NOTE — H&P
Primary/Requesting provider: Nicole Rangel DO          Chief Complaint   Patient presents with    New Patient       NP Umbilical hernia          Patient is a 46 y.o. male who presents for evaluation of a hernia; he is known to the group from [place] last year by Dr Roosevelt Garibay.  He reports a 1.5-2year history of the hernia. He notes definite enlargement recently. His only symptom is mild (1/10) soreness, which seems to associated with bloating after eating. The hernia does spontaneously reduce at night/when supine. He is not experiencing n/v, alteration of baseline bowel/bladder function. He has not had any prior abdominal surgery. Medications:          Current Outpatient Medications on File Prior to Visit   Medication Sig Dispense Refill    acetaminophen (TYLENOL) 500 MG tablet Take 1,000 mg by mouth every 6 hours          No current facility-administered medications on file prior to visit. Allergies: No Known Allergies      Past History:       Past Medical History:   Diagnosis Date    Chronic right shoulder pain      Hypertriglyceridemia 05/25/2021     no meds     Obesity (BMI 30.0-34. 9)      Smokeless tobacco use              Past Surgical History:   Procedure Laterality Date    COLONOSCOPY N/A 8/3/2021     COLONOSCOPY/BMI 35 performed by Krystal Albert DO at [address] Left 01/05/2022    WISDOM TOOTH EXTRACTION             Family and Social History:        Family History   Problem Relation Age of Onset    Diabetes Father      Cancer Father           prostate    COPD Mother        Social History            Socioeconomic History    Marital status:        Spouse name: Not on file    Number of children: Not on file    Years of education: Not on file    Highest education level: Not on file   Occupational History    Not on file   Tobacco Use    Smoking status: Never    Smokeless tobacco: Current    Tobacco comments:       Quit smoking: dip   Substance and Sexual Activity    Alcohol use: Yes       Comment: 1-2 beer a day    Drug use: No    Sexual activity: Not on file   Other Topics Concern    Not on file   Social History Narrative    Not on file      Social Determinants of Health      Financial Resource Strain: Not on file   Food Insecurity: Not on file   Transportation Needs: Not on file   Physical Activity: Not on file   Stress: Not on file   Social Connections: Not on file   Intimate Partner Violence: Not on file   Housing Stability: Not on file            Review of Systems   Constitutional:  Negative for appetite change, chills and fever. HENT:  Negative for congestion, drooling and ear pain. Eyes:  Negative for pain, redness and itching. Respiratory:  Negative for cough, shortness of breath and wheezing. Cardiovascular:  Negative for chest pain, palpitations and leg swelling. Gastrointestinal:  Negative for abdominal pain, anal bleeding and blood in stool. Endocrine: Negative for cold intolerance, heat intolerance and polydipsia. Genitourinary:  Negative for difficulty urinating, frequency and urgency. Musculoskeletal:  Negative for back pain, joint swelling and neck pain. Skin:  Negative for color change, rash and wound. Allergic/Immunologic: Negative for environmental allergies, food allergies and immunocompromised state. Neurological:  Negative for dizziness, seizures and numbness. Hematological:  Negative for adenopathy. Does not bruise/bleed easily. Psychiatric/Behavioral:  Negative for confusion and hallucinations. The patient is not nervous/anxious. Physical Exam  Vitals and nursing note reviewed. Constitutional:       Appearance: He is obese. HENT:      Head: Normocephalic and atraumatic. Eyes:      General: No scleral icterus. Pupils: Pupils are equal, round, and reactive to light. Cardiovascular:      Rate and Rhythm: Normal rate. Pulmonary:      Effort: Pulmonary effort is normal. No respiratory distress. Breath sounds: No stridor. Abdominal:      General: Bowel sounds are normal. There is no distension. Palpations: Abdomen is soft. Tenderness: There is no abdominal tenderness. There is no guarding. Hernia: A hernia (fat-containing, reducible umbilical hernia. defect est 1.5cm) is present. Neurological:      General: No focal deficit present. Mental Status: He is alert and oriented to person, place, and time. Cranial Nerves: No cranial nerve deficit. Psychiatric:         Mood and Affect: Mood normal.         Behavior: Behavior normal.         Thought Content: Thought content normal.         Judgment: Judgment normal.               ASSESSMENT and PLAN:     1. Umbilical hernia without obstruction and without gangrene          Discussed natural history of umbilical hernias. Given small defect size currently, his near-term risk of emergency event is low. Repair is appropriate in light of discomfort but is not urgent. Will proceed with  umbilical hernia repair with mesh. Technical details of the procedure are reviewed. Risks reviewed include risks of anesthesia, bleeding, infection, visceral injury, persistent post-operative pain, and hernia recurrence. All questions are answered.

## 2022-12-05 NOTE — OP NOTE
Operative Report    Patient: Emmanuel Ornelas MRN: [id number]     YOB: 1970  Age: 46 y.o. Sex: male       Date of Surgery: 12/5/2022     Preoperative Diagnosis: umbilical hernia    Postoperative Diagnosis: umbilical hernia     Procedure:  Umbilical Hernia Repair with mesh     Anesthesia: general (LMA)    Complications: none    EBL: minimal    Indications:  As outlined in History and Physical.    Procedure Details   Informed consent was obtained and the patient was brought to the operating room and placed on the table in a supine position. After successful induction of anesthesia, the abdomen was prepped and draped in the standard fashion. A curvilinear incision was made from the 3 o'clock to the 9 o'clock position and cautery and blunt dissection was used to identify and isolate the hernia stalk from the surrounding tissues. The umbilical skin was then dissected off of the contents and reflected superiorly. The hernia was then amputated at the fascial level, revealing a 1.5cm fascial defect. Digital exploration of the abdominal wall did not reveal additional defects and did not reveal adhesions. The tissue quality of the fascia was noted to be good for age. A small Ventralex patch was thus brought to the field and inserted through the defect with care taken to avoid entrapment of any visceral structures and assured to be completely unfurled. The positioning straps were then trimmed and sutured to the sides of the defect with 2-0 Prolene. This was also used to close the defect transversely over the mesh. The area was then irrigated and confirmed hemostatic and infiltrated with additional local.  The umbilical skin was then tacked to the fascia with 3-0 vicryl to recreate the umbilicus,   and then closed with subcuticular 4-0 Vicryl. Steri-Strips, a tonsil sponge,  telfa, and a tegaderm dressing were applied as a pressure dressing.   The patient tolerated the procedure well and was awakened from anesthesia and taken to recovery in satisfactory condition. Sponge, needle, and instrument counts were correct as reported to me.     Nabila Pugh MD  December 5, 2022

## 2022-12-05 NOTE — DISCHARGE INSTRUCTIONS
Cherylene Bass, M.D.  [phone]    Instructions following Hernia Repair:    ACTIVITY:  Try to take a few short walks with help around the house later today. It is very important to take short walks to avoid blood clots and pneumonia. You may be light-headed or sleepy from anesthesia, so be careful going up and down stairs. Avoid any activity that involves lifting/pushing more than 30 pounds until your followup appointment  DIET:  Drink only clear, non-carbonated liquids when you first get home (sugar-free if you are diabetic), such as Gatorade, chicken broth, etc.  Later  you may resume a more normal diet, depending on how you feel  Using Miralax or other over the counter laxative is ok if you develop constipation    PAIN:  You will be given a prescription for pain medication. Try to take the pain medication with food, even a few crackers. You may also use Tylenol, Motrin, Advil, or Aleve instead of the prescription pain medication. Do no take Tylenol and the prescription pain medication within 6 hours of each other. URINARY RETENTION: If you are unable to empty your bladder within 6 hours after returning home, please go to your nearest Emergency Department or Urgent Care for urinary catheterization. WOUND CARE:  You may shower the day after surgery, unless instructed otherwise. It is not uncommon for the incisions to ooze or drain blood-tinged fluid. You may remove the clear dressing on the fifth postoperative day. Incisions will sometimes develop redness around them, up to the size of a quarter, as well as a hard lumpy feel. If this redness continues to get larger, please call the office. FOLLOW UP:  Your follow-up appointment is usually made when your surgery is arranged. Please call the office if you are not sure of this appointment. CALL THE DOCTOR IF:  You have a temperature higher than 101.5° Fahrenheit for more than 6 hours. You have severe nausea or vomiting.   You develop increasing redness or infection at the incision. Continue home medications as previously prescribed.

## 2022-12-07 ASSESSMENT — ENCOUNTER SYMPTOMS
DIARRHEA: 0
ABDOMINAL PAIN: 0
CONSTIPATION: 0
NAUSEA: 0
GASTROINTESTINAL NEGATIVE: 1
VOMITING: 0

## 2022-12-07 NOTE — PROGRESS NOTES
[address] [facility]  [phone]        Name: Awa Desouza      MRN: [id number]       : 1970             PCP: Yarelis De La Fuente DO       CC:    Chief Complaint   Patient presents with    Post-Op Check     S/p 04 Umbilical hernia repair with mesh       HPI:  .Awa Desouza is a 45y.o. year-old male who presents for a post-op visit s/p Umbilical Hernia Repair with mesh done per Dr. Ramandeep Paz on 2022. Patient reports he is doing well, tolerating a regular diet, voiding without difficulty and having normal BM's. Patient denies Fevers, chills, nausea, vomiting, diarrhea, constipation or incisional drainage. Objective:   Review of Systems   Constitutional:  Negative for chills and fever. Gastrointestinal: Negative. Negative for abdominal pain, constipation, diarrhea, nausea and vomiting. Physical Exam  Constitutional:       Appearance: Normal appearance. Abdominal:      General: Bowel sounds are normal.      Palpations: Abdomen is soft. Comments: Abdominal incision approximated with steri-strips and no erythema or drainage. No hematoma or recurrent hernia present    Steri-strips removed and incision remains approximated. Skin:     General: Skin is warm. Capillary Refill: Capillary refill takes less than 2 seconds. Neurological:      General: No focal deficit present. Mental Status: He is alert and oriented to person, place, and time.    Psychiatric:         Mood and Affect: Mood normal.       Assessment/Plan:  46y.o. year-old male who presents for a post-op visit s/p Umbilical Hernia Repair with mesh done per Dr. Ramandeep Paz on 2022.      -Follow up PRN  -Diet as tolerated  -Resume activity as tolerated   -No tub bath until incisional site is completely healed    CECILE Guerrero - NP

## 2022-12-16 ENCOUNTER — OFFICE VISIT (OUTPATIENT)
Dept: SURGERY | Age: 52
End: 2022-12-16

## 2022-12-16 DIAGNOSIS — Z09 POSTOPERATIVE EXAMINATION: Primary | ICD-10-CM

## 2022-12-16 PROCEDURE — 99024 POSTOP FOLLOW-UP VISIT: CPT | Performed by: NURSE PRACTITIONER

## 2022-12-16 NOTE — PATIENT INSTRUCTIONS
-Follow up PRN  -Diet as tolerated  -Resume activity as tolerated   -No tub bath until incisional site is completely healed

## 2022-12-20 ENCOUNTER — TELEPHONE (OUTPATIENT)
Dept: ORTHOPEDIC SURGERY | Age: 52
End: 2022-12-20

## 2023-01-12 ENCOUNTER — TELEPHONE (OUTPATIENT)
Dept: ORTHOPEDIC SURGERY | Age: 53
End: 2023-01-12

## 2023-01-12 NOTE — TELEPHONE ENCOUNTER
Please call talia at [facility]   To hever a depo [phone]  She said she has call several times

## 2023-01-23 ENCOUNTER — TELEPHONE (OUTPATIENT)
Dept: ORTHOPEDIC SURGERY | Age: 53
End: 2023-01-23

## 2023-01-23 NOTE — TELEPHONE ENCOUNTER
Please call Yves Alex  w  [address] [facility] to set a deposition  for  this  patient  She  states  she has  been trying to  reach  someone to  set  this  up.   Ph  [phone]  ext  [phone]

## 2023-02-01 ENCOUNTER — TELEPHONE (OUTPATIENT)
Dept: ORTHOPEDIC SURGERY | Age: 53
End: 2023-02-01

## 2023-02-01 NOTE — TELEPHONE ENCOUNTER
I will speak with Dr. Sabrina Garcia to schedule a deposition that works with his schedule. After speaking with Dr. Sabrina Garcia I will call and speak with [facility].

## 2023-02-15 ENCOUNTER — TELEPHONE (OUTPATIENT)
Dept: ORTHOPEDIC SURGERY | Age: 53
End: 2023-02-15

## 2023-02-15 NOTE — TELEPHONE ENCOUNTER
I called and spoke to Patrice with [facility]. I told her that Dr. Leo Glover could 2/23 or 3/2 at [address] [place] [address] at the [facility].

## 2023-03-09 ENCOUNTER — OFFICE VISIT (OUTPATIENT)
Dept: ORTHOPEDIC SURGERY | Age: 53
End: 2023-03-09

## 2023-03-09 DIAGNOSIS — Z96.642 STATUS POST LEFT HIP REPLACEMENT: ICD-10-CM

## 2023-03-09 DIAGNOSIS — Z96.642 HISTORY OF LEFT HIP REPLACEMENT: Primary | ICD-10-CM

## 2023-03-09 NOTE — PROGRESS NOTES
Patient ID:  Regino Davey  [id number]  46 y.o.  1970    Today: March 9, 2023       CHIEF COMPLAINT:  Follow-up of left total hip replacement    HISTORY:  The patient presents today for 1 year follow-up after total hip replacement. The patient is doing great and can perform all desired and required activities. Past Medical History:  Past Medical History:   Diagnosis Date    Chronic right shoulder pain     Hypertriglyceridemia 05/25/2021    no meds     Obesity (BMI 30.0-34. 9)     Smokeless tobacco use        Past Surgical History:  Past Surgical History:   Procedure Laterality Date    COLONOSCOPY N/A 8/3/2021    COLONOSCOPY/BMI 33 performed by Bonnie Gupta DO at [address] [place] [address] Left 37/33/8261    UMBILICAL HERNIA REPAIR N/A 29/9/0514    UMBILICAL HERNIA REPAIR W/ MESH performed by Kasandra Padron MD at [address] [place] [address]          Medications:     Prior to Admission medications    Medication Sig Start Date End Date Taking? Authorizing Provider   acetaminophen (TYLENOL) 500 MG tablet Take 1,000 mg by mouth every 6 hours 12/29/21   Ar Automatic Reconciliation       Family History:     Family History   Problem Relation Age of Onset    Diabetes Father     Cancer Father         prostate    COPD Mother        Social History:      Social History     Tobacco Use    Smoking status: Never    Smokeless tobacco: Current    Tobacco comments:     Quit smoking: dip   Substance Use Topics    Alcohol use: Yes     Comment: 1-2 beer a day           Allergies:    No Known Allergies       ROS:  Patient Health Form has been filled out, reviewed, signed and included in the chart. ROS findings related to musculoskeletal problems were discussed with the patient. Patient advised to discuss non-orthopedic complaints with primary care physician. ROS:Patient Health Form has been filled out, reviewed, signed and included in the chart.   ROS findings related to musculoskeletal problems were discussed with the patient. Patient advised to discuss non-orthopedic complaints with primary care physician. PE:  Incision is examined which is healing well. No erythema, induration or drainage. Distally the patient is able to grossly plantar and dorsiflex foot and ankle. Sensation intact. Limb is perfused. No sign of DVT. X-RAYS:    XR Pelvis 2/3 Views  Views Obtained: AP Pelvis and Frog leg lateral of left hip  Indication: Postop left Total hip Replacement  Findings:   X-rays are viewed which show total hip replacement in place on the left side. All hardware appears to be stable compared to immediate postoperative films. The acetabular component appears to be in good position, no evidence of loosening. Anteversion and inclination angle appear to be within acceptable criteria. The stem appears to be appropriately sized without any evidence of subsidence. No evidence of proximal femoral periprosthetic fracture. Hip is reduced. Impression: Normal Xray after total hip replacement    Cyndi Leblanc MD    ASSESSMENT:  S/P Left Total Hip Replacement    PLAN:  Continue activity and current weight bearing status. The patient has previously been given a script for antibiotics to be taken for dental prophylaxis. I will plan to check them back in 5 years for follow-up or sooner if they have any issues, questions or concerns.              Signed By: Cyndi Leblanc MD  March 9, 2023

## 2023-09-06 ASSESSMENT — PATIENT HEALTH QUESTIONNAIRE - PHQ9
SUM OF ALL RESPONSES TO PHQ QUESTIONS 1-9: 0
2. FEELING DOWN, DEPRESSED OR HOPELESS: NOT AT ALL
SUM OF ALL RESPONSES TO PHQ9 QUESTIONS 1 & 2: 0
1. LITTLE INTEREST OR PLEASURE IN DOING THINGS: NOT AT ALL
1. LITTLE INTEREST OR PLEASURE IN DOING THINGS: 0
SUM OF ALL RESPONSES TO PHQ9 QUESTIONS 1 & 2: 0
2. FEELING DOWN, DEPRESSED OR HOPELESS: 0
SUM OF ALL RESPONSES TO PHQ QUESTIONS 1-9: 0

## 2023-09-07 ENCOUNTER — OFFICE VISIT (OUTPATIENT)
Dept: FAMILY MEDICINE CLINIC | Facility: CLINIC | Age: 53
End: 2023-09-07
Payer: COMMERCIAL

## 2023-09-07 VITALS
BODY MASS INDEX: 31.52 KG/M2 | RESPIRATION RATE: 16 BRPM | WEIGHT: 189.2 LBS | DIASTOLIC BLOOD PRESSURE: 82 MMHG | SYSTOLIC BLOOD PRESSURE: 132 MMHG | HEIGHT: 65 IN

## 2023-09-07 DIAGNOSIS — G89.29 CHRONIC RIGHT SHOULDER PAIN: Chronic | ICD-10-CM

## 2023-09-07 DIAGNOSIS — R53.83 LACK OF ENERGY: ICD-10-CM

## 2023-09-07 DIAGNOSIS — R68.82 DIMINISHED LIBIDO: Chronic | ICD-10-CM

## 2023-09-07 DIAGNOSIS — E29.1 MALE HYPOGONADISM: Chronic | ICD-10-CM

## 2023-09-07 DIAGNOSIS — M25.511 CHRONIC RIGHT SHOULDER PAIN: Chronic | ICD-10-CM

## 2023-09-07 DIAGNOSIS — M25.619 LIMITED RANGE OF MOTION (ROM) OF SHOULDER: ICD-10-CM

## 2023-09-07 DIAGNOSIS — Z00.00 WELL ADULT EXAM: Primary | ICD-10-CM

## 2023-09-07 DIAGNOSIS — E66.09 CLASS 1 OBESITY DUE TO EXCESS CALORIES WITHOUT SERIOUS COMORBIDITY WITH BODY MASS INDEX (BMI) OF 33.0 TO 33.9 IN ADULT: ICD-10-CM

## 2023-09-07 PROBLEM — Z79.890 ENCOUNTER FOR MONITORING TESTOSTERONE REPLACEMENT THERAPY: Status: RESOLVED | Noted: 2021-06-09 | Resolved: 2023-09-07

## 2023-09-07 PROBLEM — Z51.81 ENCOUNTER FOR MONITORING TESTOSTERONE REPLACEMENT THERAPY: Status: RESOLVED | Noted: 2021-06-09 | Resolved: 2023-09-07

## 2023-09-07 PROCEDURE — 99396 PREV VISIT EST AGE 40-64: CPT | Performed by: FAMILY MEDICINE

## 2023-09-07 SDOH — ECONOMIC STABILITY: FOOD INSECURITY: WITHIN THE PAST 12 MONTHS, THE FOOD YOU BOUGHT JUST DIDN'T LAST AND YOU DIDN'T HAVE MONEY TO GET MORE.: NEVER TRUE

## 2023-09-07 SDOH — ECONOMIC STABILITY: FOOD INSECURITY: WITHIN THE PAST 12 MONTHS, YOU WORRIED THAT YOUR FOOD WOULD RUN OUT BEFORE YOU GOT MONEY TO BUY MORE.: NEVER TRUE

## 2023-09-07 SDOH — ECONOMIC STABILITY: HOUSING INSECURITY
IN THE LAST 12 MONTHS, WAS THERE A TIME WHEN YOU DID NOT HAVE A STEADY PLACE TO SLEEP OR SLEPT IN A SHELTER (INCLUDING NOW)?: NO

## 2023-09-07 SDOH — ECONOMIC STABILITY: INCOME INSECURITY: HOW HARD IS IT FOR YOU TO PAY FOR THE VERY BASICS LIKE FOOD, HOUSING, MEDICAL CARE, AND HEATING?: NOT HARD AT ALL

## 2023-09-07 ASSESSMENT — ENCOUNTER SYMPTOMS
EYES NEGATIVE: 1
VOMITING: 0
ABDOMINAL PAIN: 0
CONSTIPATION: 0
BACK PAIN: 0
NAUSEA: 0
DIARRHEA: 0
ALLERGIC/IMMUNOLOGIC NEGATIVE: 1
WHEEZING: 0
COUGH: 0
CHEST TIGHTNESS: 0
SHORTNESS OF BREATH: 0

## 2023-09-07 NOTE — PROGRESS NOTES
Yossi Pineda DO                Diplomate of the [employer] of [facility] [place]         [phone]    Linde Merlin is a 45 y.o. male who was seen on 9/7/2023 for   Chief Complaint   Patient presents with    Annual Exam    Shoulder Pain     Some tightness and pain in right shoulder. Not sure if arthritis or not. Has had for about 8-10 months. Assessment & Plan     Diagnosis Orders   1. Well adult exam        2. Class 1 obesity due to excess calories without serious comorbidity with body mass index (BMI) of 33.0 to 33.9 in adult      Worsening. Low carb diet advised/reviewed. Information given. 3. Chronic right shoulder pain        4. Limited range of motion (ROM) of shoulder  External Referral to Physical Therapy    RIGHT      5. Lack of energy  Testosterone, free, total    Testosterone, free, total      6. Diminished libido  Testosterone, free, total    Testosterone, free, total      7. Male hypogonadism            Follow-up and Dispositions    Return in about 1 year (around 9/7/2024) for CPE NO SAMUEL, LABS TODAY, 1 week follow up testosterone (as soon as avail). On this date 09/07/2023 I have spent 30 minutes reviewing previous notes, lab/imaging results and face to face with the patient discussing the diagnoses and importance of compliance with the treatment plan, as well as documenting on the day of the visit.     RECENT LABS/TESTS TO REVIEW and DISCUSS    Results for orders placed or performed in visit on 09/07/23   Testosterone, free, total   Result Value Ref Range    Testosterone 249 (L) 264 - 916 ng/dL    Testosterone, Free 5.2 (L) 7.2 - 24.0 pg/mL           9/6/2023    10:05 AM 8/4/2022    11:24 AM   PHQ-9    Little interest or pleasure in doing things 0 0   Feeling down, depressed, or hopeless 0 0   PHQ-2 Score 0 0   PHQ-9 Total Score 0 0       Component      Latest Ref Rng 5/27/2021 9/3/2021

## 2023-09-13 ENCOUNTER — OFFICE VISIT (OUTPATIENT)
Dept: FAMILY MEDICINE CLINIC | Facility: CLINIC | Age: 53
End: 2023-09-13
Payer: COMMERCIAL

## 2023-09-13 VITALS
SYSTOLIC BLOOD PRESSURE: 132 MMHG | HEIGHT: 65 IN | DIASTOLIC BLOOD PRESSURE: 80 MMHG | BODY MASS INDEX: 33.32 KG/M2 | RESPIRATION RATE: 16 BRPM | WEIGHT: 200 LBS

## 2023-09-13 DIAGNOSIS — Z79.899 HIGH RISK MEDICATION USE: ICD-10-CM

## 2023-09-13 DIAGNOSIS — Z79.890 LONG-TERM CURRENT USE OF TESTOSTERONE CYPIONATE: ICD-10-CM

## 2023-09-13 DIAGNOSIS — E29.1 MALE HYPOGONADISM: Primary | Chronic | ICD-10-CM

## 2023-09-13 DIAGNOSIS — R68.82 DIMINISHED LIBIDO: Chronic | ICD-10-CM

## 2023-09-13 PROCEDURE — 99214 OFFICE O/P EST MOD 30 MIN: CPT | Performed by: FAMILY MEDICINE

## 2023-09-13 RX ORDER — TESTOSTERONE CYPIONATE 200 MG/ML
100 INJECTION, SOLUTION INTRAMUSCULAR WEEKLY
Qty: 6 ML | Refills: 0 | Status: SHIPPED | OUTPATIENT
Start: 2023-09-13 | End: 2023-11-30

## 2023-09-13 ASSESSMENT — ENCOUNTER SYMPTOMS
ALLERGIC/IMMUNOLOGIC NEGATIVE: 1
CHEST TIGHTNESS: 0
CONSTIPATION: 0
DIARRHEA: 0
SHORTNESS OF BREATH: 0
VOMITING: 0
WHEEZING: 0
COUGH: 0
EYES NEGATIVE: 1
NAUSEA: 0
BACK PAIN: 0
ABDOMINAL PAIN: 0

## 2023-09-14 LAB
TESTOST FREE SERPL-MCNC: 5.2 PG/ML (ref 7.2–24)
TESTOST SERPL-MCNC: 249 NG/DL (ref 264–916)

## 2023-09-21 ENCOUNTER — NURSE ONLY (OUTPATIENT)
Dept: FAMILY MEDICINE CLINIC | Facility: CLINIC | Age: 53
End: 2023-09-21
Payer: COMMERCIAL

## 2023-09-21 DIAGNOSIS — E29.1 MALE HYPOGONADISM: Primary | ICD-10-CM

## 2023-09-21 PROCEDURE — 96372 THER/PROPH/DIAG INJ SC/IM: CPT | Performed by: FAMILY MEDICINE

## 2023-09-21 RX ORDER — TESTOSTERONE CYPIONATE 200 MG/ML
100 INJECTION, SOLUTION INTRAMUSCULAR ONCE
Status: COMPLETED | OUTPATIENT
Start: 2023-09-21 | End: 2023-09-21

## 2023-09-21 RX ADMIN — TESTOSTERONE CYPIONATE 100 MG: 200 INJECTION, SOLUTION INTRAMUSCULAR at 11:52

## 2023-09-24 PROBLEM — G89.29 CHRONIC RIGHT SHOULDER PAIN: Chronic | Status: ACTIVE | Noted: 2017-07-25

## 2023-09-24 PROBLEM — M25.511 CHRONIC RIGHT SHOULDER PAIN: Chronic | Status: ACTIVE | Noted: 2017-07-25

## 2023-09-24 PROBLEM — M25.619 LIMITED RANGE OF MOTION (ROM) OF SHOULDER: Status: ACTIVE | Noted: 2023-09-24

## 2023-09-29 ENCOUNTER — NURSE ONLY (OUTPATIENT)
Dept: FAMILY MEDICINE CLINIC | Facility: CLINIC | Age: 53
End: 2023-09-29

## 2023-09-29 DIAGNOSIS — E29.1 MALE HYPOGONADISM: Primary | ICD-10-CM

## 2023-10-09 ENCOUNTER — PATIENT MESSAGE (OUTPATIENT)
Dept: FAMILY MEDICINE CLINIC | Facility: CLINIC | Age: 53
End: 2023-10-09

## 2023-11-22 DIAGNOSIS — E29.1 MALE HYPOGONADISM: Primary | Chronic | ICD-10-CM

## 2023-12-12 ENCOUNTER — NURSE ONLY (OUTPATIENT)
Dept: FAMILY MEDICINE CLINIC | Facility: CLINIC | Age: 53
End: 2023-12-12

## 2023-12-12 DIAGNOSIS — E29.1 MALE HYPOGONADISM: Chronic | ICD-10-CM

## 2023-12-15 LAB — TESTOST FREE SERPL-MCNC: 21.2 PG/ML (ref 7.2–24)

## 2023-12-19 ENCOUNTER — TELEPHONE (OUTPATIENT)
Dept: FAMILY MEDICINE CLINIC | Facility: CLINIC | Age: 53
End: 2023-12-19

## 2023-12-27 ENCOUNTER — TELEPHONE (OUTPATIENT)
Dept: FAMILY MEDICINE CLINIC | Facility: CLINIC | Age: 53
End: 2023-12-27

## 2023-12-27 DIAGNOSIS — E29.1 MALE HYPOGONADISM: Primary | ICD-10-CM

## 2023-12-27 NOTE — TELEPHONE ENCOUNTER
----- Message from Alejandro Lang sent at 12/27/2023 10:12 AM EST -----  Patient called back for lab results.  Selina Rosas wants him to have cbc done I have scheduled him for in the morning so I didn't know if we were using [facility] or [facility] needed to add one

## 2023-12-28 ENCOUNTER — NURSE ONLY (OUTPATIENT)
Dept: FAMILY MEDICINE CLINIC | Facility: CLINIC | Age: 53
End: 2023-12-28

## 2023-12-28 DIAGNOSIS — E29.1 MALE HYPOGONADISM: ICD-10-CM

## 2023-12-28 LAB
BASOPHILS # BLD: 0.1 K/UL (ref 0–0.2)
BASOPHILS NFR BLD: 1 % (ref 0–2)
DIFFERENTIAL METHOD BLD: ABNORMAL
EOSINOPHIL # BLD: 0.3 K/UL (ref 0–0.8)
EOSINOPHIL NFR BLD: 4 % (ref 0.5–7.8)
ERYTHROCYTE [DISTWIDTH] IN BLOOD BY AUTOMATED COUNT: 11.8 % (ref 11.9–14.6)
HCT VFR BLD AUTO: 52.7 % (ref 41.1–50.3)
HGB BLD-MCNC: 17.2 G/DL (ref 13.6–17.2)
IMM GRANULOCYTES # BLD AUTO: 0 K/UL (ref 0–0.5)
IMM GRANULOCYTES NFR BLD AUTO: 0 % (ref 0–5)
LYMPHOCYTES # BLD: 1.5 K/UL (ref 0.5–4.6)
LYMPHOCYTES NFR BLD: 22 % (ref 13–44)
MCH RBC QN AUTO: 32.6 PG (ref 26.1–32.9)
MCHC RBC AUTO-ENTMCNC: 32.6 G/DL (ref 31.4–35)
MCV RBC AUTO: 99.8 FL (ref 82–102)
MONOCYTES # BLD: 0.8 K/UL (ref 0.1–1.3)
MONOCYTES NFR BLD: 12 % (ref 4–12)
NEUTS SEG # BLD: 4.2 K/UL (ref 1.7–8.2)
NEUTS SEG NFR BLD: 61 % (ref 43–78)
NRBC # BLD: 0 K/UL (ref 0–0.2)
PLATELET # BLD AUTO: 297 K/UL (ref 150–450)
PMV BLD AUTO: 9.7 FL (ref 9.4–12.3)
RBC # BLD AUTO: 5.28 M/UL (ref 4.23–5.6)
WBC # BLD AUTO: 6.9 K/UL (ref 4.3–11.1)

## 2024-01-16 DIAGNOSIS — R68.82 DIMINISHED LIBIDO: Chronic | ICD-10-CM

## 2024-01-16 DIAGNOSIS — E29.1 MALE HYPOGONADISM: Chronic | ICD-10-CM

## 2024-01-17 RX ORDER — TESTOSTERONE CYPIONATE 200 MG/ML
100 INJECTION, SOLUTION INTRAMUSCULAR
Qty: 5 ML | Refills: 1 | Status: SHIPPED | OUTPATIENT
Start: 2024-01-17 | End: 2028-12-12

## 2024-01-20 SDOH — HEALTH STABILITY: PHYSICAL HEALTH: ON AVERAGE, HOW MANY DAYS PER WEEK DO YOU ENGAGE IN MODERATE TO STRENUOUS EXERCISE (LIKE A BRISK WALK)?: 2 DAYS

## 2024-01-23 ENCOUNTER — OFFICE VISIT (OUTPATIENT)
Dept: FAMILY MEDICINE CLINIC | Facility: CLINIC | Age: 54
End: 2024-01-23
Payer: COMMERCIAL

## 2024-01-23 VITALS
WEIGHT: 195 LBS | OXYGEN SATURATION: 98 % | HEART RATE: 96 BPM | HEIGHT: 64 IN | DIASTOLIC BLOOD PRESSURE: 84 MMHG | SYSTOLIC BLOOD PRESSURE: 126 MMHG | BODY MASS INDEX: 33.29 KG/M2

## 2024-01-23 DIAGNOSIS — E29.1 MALE HYPOGONADISM: Chronic | ICD-10-CM

## 2024-01-23 DIAGNOSIS — R68.82 DIMINISHED LIBIDO: Chronic | ICD-10-CM

## 2024-01-23 DIAGNOSIS — D75.1 ERYTHROCYTOSIS: Primary | ICD-10-CM

## 2024-01-23 PROCEDURE — 99214 OFFICE O/P EST MOD 30 MIN: CPT | Performed by: FAMILY MEDICINE

## 2024-01-23 ASSESSMENT — PATIENT HEALTH QUESTIONNAIRE - PHQ9
2. FEELING DOWN, DEPRESSED OR HOPELESS: 0
SUM OF ALL RESPONSES TO PHQ QUESTIONS 1-9: 0
SUM OF ALL RESPONSES TO PHQ QUESTIONS 1-9: 0
1. LITTLE INTEREST OR PLEASURE IN DOING THINGS: 0
SUM OF ALL RESPONSES TO PHQ9 QUESTIONS 1 & 2: 0
SUM OF ALL RESPONSES TO PHQ QUESTIONS 1-9: 0
SUM OF ALL RESPONSES TO PHQ QUESTIONS 1-9: 0

## 2024-01-23 NOTE — PROGRESS NOTES
PROGRESS NOTE    SUBJECTIVE:   Santiago Pandey is a 53 y.o. male seen for a follow up visit regarding   Chief Complaint   Patient presents with    Establish Care     Here to establish care with provider; former patient of Dr. Hall's        HPI:  Here today for follow-up of hypogonadism.  He is doing well presently, voices no complaints.  Recently he was found to have some erythrocytosis, felt to be due secondary to his injectable testosterone.  He was taking injections weekly, 100 mg.  He donated a unit of blood [facility] and we have cut back on his injections to every 10 days.  Fortunately his free testosterone level was upper limit of normal so there was room to cut back on the dose.  He is feeling much better since starting testosterone supplementation.  He works long enforcement, has a physical with labs yearly.  Reports that his last lipid panel was very good.         Reviewed and updated this visit by provider:  Tobacco  Allergies  Meds  Problems  Med Hx  Surg Hx  Fam Hx           Review of Systems       OBJECTIVE:  Vitals:    01/23/24 1322   BP: 126/84   Site: Left Upper Arm   Cuff Size: Medium Adult   Pulse: 96   SpO2: 98%   Weight: 88.5 kg (195 lb)   Height: 1.638 m (5' 4.49\")        Physical Exam   General: Alert and oriented x3, well-appearing  Neck: No adenopathy, thyromegaly or thyroid nodules  Pulmonary: Normal effort, good airflow, no rales or rhonchi  CVS: Regular rate and rhythm, normal S1, S2, no S3 or S4, no murmurs; no carotid bruits, 2+ pedal pulses      Medical problems and test results were reviewed with the patient today.     Recent Results (from the past 672 hour(s))   CBC with Auto Differential    Collection Time: 12/28/23 10:45 AM   Result Value Ref Range    WBC 6.9 4.3 - 11.1 K/uL    RBC 5.28 4.23 - 5.6 M/uL    Hemoglobin 17.2 13.6 - 17.2 g/dL    Hematocrit 52.7 (H) 41.1 - 50.3 %    MCV 99.8 82 - 102 FL    MCH 32.6 26.1 - 32.9 PG    MCHC 32.6 31.4 - 35.0 g/dL

## 2024-02-12 NOTE — TELEPHONE ENCOUNTER
Chronic, severe, BMI 15.59 kg/m², secondary to poor p.o. intake in setting of recurring nausea vomiting of unknown etiology.    Plan  -Diet per nutrition recommendations  -will monitor phosphate and electrolytes due to high risk of refeeding syndrome    Placed CBC under Dr. Yoli Crane. Patient already scheduled for labs tomorrow.

## 2024-02-29 ENCOUNTER — NURSE ONLY (OUTPATIENT)
Dept: FAMILY MEDICINE CLINIC | Facility: CLINIC | Age: 54
End: 2024-02-29

## 2024-02-29 DIAGNOSIS — E29.1 MALE HYPOGONADISM: Chronic | ICD-10-CM

## 2024-02-29 DIAGNOSIS — R68.82 DIMINISHED LIBIDO: Chronic | ICD-10-CM

## 2024-03-02 LAB — TESTOST SERPL-MCNC: 774 NG/DL (ref 264–916)

## 2024-03-04 LAB
TESTOST FREE SERPL-MCNC: 10 PG/ML (ref 7.2–24)
TESTOST SERPL-MCNC: 774 NG/DL (ref 264–916)

## 2024-03-19 DIAGNOSIS — R68.82 DIMINISHED LIBIDO: Chronic | ICD-10-CM

## 2024-03-19 DIAGNOSIS — E29.1 MALE HYPOGONADISM: Chronic | ICD-10-CM

## 2024-03-19 RX ORDER — TESTOSTERONE CYPIONATE 200 MG/ML
100 INJECTION, SOLUTION INTRAMUSCULAR
Qty: 5 ML | Refills: 1 | Status: SHIPPED | OUTPATIENT
Start: 2024-03-19 | End: 2029-02-12

## 2024-07-15 ENCOUNTER — LAB (OUTPATIENT)
Dept: FAMILY MEDICINE CLINIC | Facility: CLINIC | Age: 54
End: 2024-07-15

## 2024-07-15 DIAGNOSIS — E29.1 MALE HYPOGONADISM: Primary | Chronic | ICD-10-CM

## 2024-07-15 DIAGNOSIS — E29.1 MALE HYPOGONADISM: Primary | ICD-10-CM

## 2024-07-19 ENCOUNTER — LAB (OUTPATIENT)
Dept: FAMILY MEDICINE CLINIC | Facility: CLINIC | Age: 54
End: 2024-07-19

## 2024-07-19 DIAGNOSIS — E29.1 MALE HYPOGONADISM: ICD-10-CM

## 2024-07-19 LAB
BASOPHILS # BLD: 0 K/UL (ref 0–0.2)
BASOPHILS NFR BLD: 1 % (ref 0–2)
DIFFERENTIAL METHOD BLD: NORMAL
EOSINOPHIL # BLD: 0.2 K/UL (ref 0–0.8)
EOSINOPHIL NFR BLD: 3 % (ref 0.5–7.8)
ERYTHROCYTE [DISTWIDTH] IN BLOOD BY AUTOMATED COUNT: 13 % (ref 11.9–14.6)
HCT VFR BLD AUTO: 47.7 % (ref 41.1–50.3)
HGB BLD-MCNC: 15.3 G/DL (ref 13.6–17.2)
IMM GRANULOCYTES # BLD AUTO: 0 K/UL (ref 0–0.5)
IMM GRANULOCYTES NFR BLD AUTO: 0 % (ref 0–5)
IRON SATN MFR SERPL: 23 % (ref 20–50)
IRON SERPL-MCNC: 72 UG/DL (ref 35–100)
LYMPHOCYTES # BLD: 1.3 K/UL (ref 0.5–4.6)
LYMPHOCYTES NFR BLD: 25 % (ref 13–44)
MCH RBC QN AUTO: 31.2 PG (ref 26.1–32.9)
MCHC RBC AUTO-ENTMCNC: 32.1 G/DL (ref 31.4–35)
MCV RBC AUTO: 97.3 FL (ref 82–102)
MONOCYTES # BLD: 0.5 K/UL (ref 0.1–1.3)
MONOCYTES NFR BLD: 10 % (ref 4–12)
NEUTS SEG # BLD: 3.1 K/UL (ref 1.7–8.2)
NEUTS SEG NFR BLD: 61 % (ref 43–78)
NRBC # BLD: 0 K/UL (ref 0–0.2)
PLATELET # BLD AUTO: 302 K/UL (ref 150–450)
PMV BLD AUTO: 9.8 FL (ref 9.4–12.3)
RBC # BLD AUTO: 4.9 M/UL (ref 4.23–5.6)
TIBC SERPL-MCNC: 312 UG/DL (ref 240–450)
UIBC SERPL-MCNC: 240 UG/DL (ref 112–347)
WBC # BLD AUTO: 5.1 K/UL (ref 4.3–11.1)

## 2024-07-21 LAB
TESTOST FREE SERPL-MCNC: 14.2 PG/ML (ref 7.2–24)
TESTOST SERPL-MCNC: 909 NG/DL (ref 264–916)

## 2024-07-23 ENCOUNTER — OFFICE VISIT (OUTPATIENT)
Dept: FAMILY MEDICINE CLINIC | Facility: CLINIC | Age: 54
End: 2024-07-23
Payer: COMMERCIAL

## 2024-07-23 VITALS
DIASTOLIC BLOOD PRESSURE: 72 MMHG | OXYGEN SATURATION: 98 % | WEIGHT: 183 LBS | HEIGHT: 64 IN | HEART RATE: 89 BPM | SYSTOLIC BLOOD PRESSURE: 114 MMHG | BODY MASS INDEX: 31.24 KG/M2

## 2024-07-23 DIAGNOSIS — R63.4 UNINTENDED WEIGHT LOSS: ICD-10-CM

## 2024-07-23 DIAGNOSIS — Z79.890 ENCOUNTER FOR MONITORING TESTOSTERONE REPLACEMENT THERAPY: Primary | ICD-10-CM

## 2024-07-23 DIAGNOSIS — Z79.890 LONG-TERM CURRENT USE OF TESTOSTERONE CYPIONATE: ICD-10-CM

## 2024-07-23 DIAGNOSIS — E29.1 MALE HYPOGONADISM: Chronic | ICD-10-CM

## 2024-07-23 DIAGNOSIS — Z51.81 ENCOUNTER FOR MONITORING TESTOSTERONE REPLACEMENT THERAPY: Primary | ICD-10-CM

## 2024-07-23 PROCEDURE — 99214 OFFICE O/P EST MOD 30 MIN: CPT | Performed by: FAMILY MEDICINE

## 2024-07-23 RX ORDER — TESTOSTERONE CYPIONATE 200 MG/ML
100 INJECTION, SOLUTION INTRAMUSCULAR
Qty: 5 ML | Refills: 1 | Status: SHIPPED | OUTPATIENT
Start: 2024-07-23 | End: 2029-06-18

## 2024-07-23 ASSESSMENT — ENCOUNTER SYMPTOMS: RESPIRATORY NEGATIVE: 1

## 2024-07-23 NOTE — PROGRESS NOTES
PROGRESS NOTE    SUBJECTIVE:   Santiago Pandey is a 53 y.o. male seen for a follow up visit regarding   Chief Complaint   Patient presents with    Follow-up     6 months- when he takes the testosterone he itches   Dermatologist says testosterone irritates the hair follicles         HPI:  Here today for follow-up of hypogonadism, getting testosterone replacement.  He is doing well with the exception of some pruritus that has developed, no evident rash but seemingly migratory pruritus, started with his scalp and has had some pruritus of both upper and lower extremities.  He has seen dermatology and they suggested maybe it was the testosterone supplement irritating/stimulating his hair follicles causing a folliculitis.  He was prescribed a lotion and some shampoo but he has not started this yet.  Patient has lost 12 pounds without dieting.  He thinks he may be a little more active, exercising more.  Patient works with local law enforcement, he has had blood work done this past April, states everything was good he thinks his PSA may have been 4.1 which is above normal.         Reviewed and updated this visit by provider:           Review of Systems   Respiratory: Negative.     Cardiovascular: Negative.           OBJECTIVE:  Vitals:    07/23/24 0803   BP: 114/72   Site: Left Upper Arm   Position: Sitting   Cuff Size: Large Adult   Pulse: 89   SpO2: 98%   Weight: 83 kg (183 lb)   Height: 1.638 m (5' 4.49\")        Physical Exam     Medical problems and test results were reviewed with the patient today.     Recent Results (from the past 672 hour(s))   Testosterone, free, total    Collection Time: 07/19/24  8:25 AM   Result Value Ref Range    Testosterone 909 264 - 916 ng/dL    Testosterone, Free 14.2 7.2 - 24.0 pg/mL   Iron and TIBC    Collection Time: 07/19/24  8:25 AM   Result Value Ref Range    Iron 72 35 - 100 ug/dL    TIBC 312 240 - 450 ug/dL    Iron % Saturation 23 20 - 50 %    UIBC 240.0 112.0 - 347.0 ug/dL

## 2024-10-28 ENCOUNTER — LAB (OUTPATIENT)
Dept: FAMILY MEDICINE CLINIC | Facility: CLINIC | Age: 54
End: 2024-10-28

## 2024-10-28 DIAGNOSIS — Z51.81 ENCOUNTER FOR MONITORING TESTOSTERONE REPLACEMENT THERAPY: ICD-10-CM

## 2024-10-28 DIAGNOSIS — Z79.890 LONG-TERM CURRENT USE OF TESTOSTERONE CYPIONATE: ICD-10-CM

## 2024-10-28 DIAGNOSIS — R63.4 UNINTENDED WEIGHT LOSS: ICD-10-CM

## 2024-10-28 DIAGNOSIS — Z79.890 ENCOUNTER FOR MONITORING TESTOSTERONE REPLACEMENT THERAPY: ICD-10-CM

## 2024-10-28 LAB
ALBUMIN SERPL-MCNC: 3.8 G/DL (ref 3.5–5)
ALBUMIN/GLOB SERPL: 1.3 (ref 1–1.9)
ALP SERPL-CCNC: 110 U/L (ref 40–129)
ALT SERPL-CCNC: 26 U/L (ref 8–55)
ANION GAP SERPL CALC-SCNC: 13 MMOL/L (ref 9–18)
AST SERPL-CCNC: 24 U/L (ref 15–37)
BASOPHILS # BLD: 0 K/UL (ref 0–0.2)
BASOPHILS NFR BLD: 1 % (ref 0–2)
BILIRUB SERPL-MCNC: 0.3 MG/DL (ref 0–1.2)
BUN SERPL-MCNC: 14 MG/DL (ref 6–23)
CALCIUM SERPL-MCNC: 9.1 MG/DL (ref 8.8–10.2)
CHLORIDE SERPL-SCNC: 103 MMOL/L (ref 98–107)
CO2 SERPL-SCNC: 25 MMOL/L (ref 20–28)
CREAT SERPL-MCNC: 0.82 MG/DL (ref 0.8–1.3)
DIFFERENTIAL METHOD BLD: NORMAL
EOSINOPHIL # BLD: 0.2 K/UL (ref 0–0.8)
EOSINOPHIL NFR BLD: 4 % (ref 0.5–7.8)
ERYTHROCYTE [DISTWIDTH] IN BLOOD BY AUTOMATED COUNT: 12.5 % (ref 11.9–14.6)
GLOBULIN SER CALC-MCNC: 3 G/DL (ref 2.3–3.5)
GLUCOSE SERPL-MCNC: 150 MG/DL (ref 70–99)
HCT VFR BLD AUTO: 46 % (ref 41.1–50.3)
HGB BLD-MCNC: 15 G/DL (ref 13.6–17.2)
IMM GRANULOCYTES # BLD AUTO: 0 K/UL (ref 0–0.5)
IMM GRANULOCYTES NFR BLD AUTO: 0 % (ref 0–5)
LYMPHOCYTES # BLD: 1.7 K/UL (ref 0.5–4.6)
LYMPHOCYTES NFR BLD: 26 % (ref 13–44)
MCH RBC QN AUTO: 31.1 PG (ref 26.1–32.9)
MCHC RBC AUTO-ENTMCNC: 32.6 G/DL (ref 31.4–35)
MCV RBC AUTO: 95.4 FL (ref 82–102)
MONOCYTES # BLD: 0.7 K/UL (ref 0.1–1.3)
MONOCYTES NFR BLD: 10 % (ref 4–12)
NEUTS SEG # BLD: 3.9 K/UL (ref 1.7–8.2)
NEUTS SEG NFR BLD: 59 % (ref 43–78)
NRBC # BLD: 0 K/UL (ref 0–0.2)
PLATELET # BLD AUTO: 298 K/UL (ref 150–450)
PMV BLD AUTO: 9.8 FL (ref 9.4–12.3)
POTASSIUM SERPL-SCNC: 4 MMOL/L (ref 3.5–5.1)
PROT SERPL-MCNC: 6.9 G/DL (ref 6.3–8.2)
PSA SERPL-MCNC: 2.7 NG/ML (ref 0–4)
RBC # BLD AUTO: 4.82 M/UL (ref 4.23–5.6)
SODIUM SERPL-SCNC: 141 MMOL/L (ref 136–145)
TSH, 3RD GENERATION: 2.84 UIU/ML (ref 0.27–4.2)
WBC # BLD AUTO: 6.5 K/UL (ref 4.3–11.1)

## 2024-11-19 ENCOUNTER — TELEPHONE (OUTPATIENT)
Dept: FAMILY MEDICINE CLINIC | Facility: CLINIC | Age: 54
End: 2024-11-19

## 2024-11-19 NOTE — TELEPHONE ENCOUNTER
Patient contacted with lab results. Patient was inquiring on changing his testosterone to the gel instead of the injections. Would like to know if provider would send Rx for gel to [facility] in [place]. Please advise.

## 2024-11-20 DIAGNOSIS — E29.1 HYPOGONADISM IN MALE: Primary | ICD-10-CM

## 2024-11-20 RX ORDER — TESTOSTERONE 1.62 MG/G
2 GEL TRANSDERMAL DAILY
Qty: 75 G | Refills: 5 | Status: SHIPPED | OUTPATIENT
Start: 2024-11-20 | End: 2025-05-19

## 2025-01-03 ENCOUNTER — PATIENT MESSAGE (OUTPATIENT)
Dept: FAMILY MEDICINE CLINIC | Facility: CLINIC | Age: 55
End: 2025-01-03

## 2025-01-03 DIAGNOSIS — E29.1 MALE HYPOGONADISM: Primary | ICD-10-CM

## 2025-01-06 NOTE — TELEPHONE ENCOUNTER
Per TE dated 11/19/24; patient requested gel instead of injections. Injections have been removed.     Testosterone lab placed.

## 2025-01-17 ENCOUNTER — LAB (OUTPATIENT)
Dept: FAMILY MEDICINE CLINIC | Facility: CLINIC | Age: 55
End: 2025-01-17

## 2025-01-17 DIAGNOSIS — Z51.81 ENCOUNTER FOR MONITORING TESTOSTERONE REPLACEMENT THERAPY: ICD-10-CM

## 2025-01-17 DIAGNOSIS — E29.1 MALE HYPOGONADISM: ICD-10-CM

## 2025-01-17 DIAGNOSIS — Z79.890 LONG-TERM CURRENT USE OF TESTOSTERONE CYPIONATE: ICD-10-CM

## 2025-01-17 DIAGNOSIS — Z79.890 ENCOUNTER FOR MONITORING TESTOSTERONE REPLACEMENT THERAPY: ICD-10-CM

## 2025-01-17 LAB
BASOPHILS # BLD: 0.04 K/UL (ref 0–0.2)
BASOPHILS NFR BLD: 0.7 % (ref 0–2)
DIFFERENTIAL METHOD BLD: NORMAL
EOSINOPHIL # BLD: 0.29 K/UL (ref 0–0.8)
EOSINOPHIL NFR BLD: 4.9 % (ref 0.5–7.8)
ERYTHROCYTE [DISTWIDTH] IN BLOOD BY AUTOMATED COUNT: 12.4 % (ref 11.9–14.6)
HCT VFR BLD AUTO: 44.2 % (ref 41.1–50.3)
HGB BLD-MCNC: 14.3 G/DL (ref 13.6–17.2)
IMM GRANULOCYTES # BLD AUTO: 0.03 K/UL (ref 0–0.5)
IMM GRANULOCYTES NFR BLD AUTO: 0.5 % (ref 0–5)
LYMPHOCYTES # BLD: 1.74 K/UL (ref 0.5–4.6)
LYMPHOCYTES NFR BLD: 29.6 % (ref 13–44)
MCH RBC QN AUTO: 30.5 PG (ref 26.1–32.9)
MCHC RBC AUTO-ENTMCNC: 32.4 G/DL (ref 31.4–35)
MCV RBC AUTO: 94.2 FL (ref 82–102)
MONOCYTES # BLD: 0.67 K/UL (ref 0.1–1.3)
MONOCYTES NFR BLD: 11.4 % (ref 4–12)
NEUTS SEG # BLD: 3.1 K/UL (ref 1.7–8.2)
NEUTS SEG NFR BLD: 52.9 % (ref 43–78)
NRBC # BLD: 0 K/UL (ref 0–0.2)
PLATELET # BLD AUTO: 330 K/UL (ref 150–450)
PMV BLD AUTO: 9.7 FL (ref 9.4–12.3)
RBC # BLD AUTO: 4.69 M/UL (ref 4.23–5.6)
WBC # BLD AUTO: 5.9 K/UL (ref 4.3–11.1)

## 2025-01-19 LAB — TESTOST SERPL-MCNC: 770 NG/DL (ref 264–916)

## 2025-01-20 SDOH — ECONOMIC STABILITY: TRANSPORTATION INSECURITY
IN THE PAST 12 MONTHS, HAS THE LACK OF TRANSPORTATION KEPT YOU FROM MEDICAL APPOINTMENTS OR FROM GETTING MEDICATIONS?: NO

## 2025-01-20 SDOH — ECONOMIC STABILITY: FOOD INSECURITY: WITHIN THE PAST 12 MONTHS, YOU WORRIED THAT YOUR FOOD WOULD RUN OUT BEFORE YOU GOT MONEY TO BUY MORE.: NEVER TRUE

## 2025-01-20 SDOH — ECONOMIC STABILITY: INCOME INSECURITY: IN THE LAST 12 MONTHS, WAS THERE A TIME WHEN YOU WERE NOT ABLE TO PAY THE MORTGAGE OR RENT ON TIME?: NO

## 2025-01-20 SDOH — ECONOMIC STABILITY: FOOD INSECURITY: WITHIN THE PAST 12 MONTHS, THE FOOD YOU BOUGHT JUST DIDN'T LAST AND YOU DIDN'T HAVE MONEY TO GET MORE.: NEVER TRUE

## 2025-01-20 SDOH — ECONOMIC STABILITY: TRANSPORTATION INSECURITY
IN THE PAST 12 MONTHS, HAS LACK OF TRANSPORTATION KEPT YOU FROM MEETINGS, WORK, OR FROM GETTING THINGS NEEDED FOR DAILY LIVING?: NO

## 2025-01-20 ASSESSMENT — PATIENT HEALTH QUESTIONNAIRE - PHQ9
SUM OF ALL RESPONSES TO PHQ QUESTIONS 1-9: 0
SUM OF ALL RESPONSES TO PHQ9 QUESTIONS 1 & 2: 0
SUM OF ALL RESPONSES TO PHQ QUESTIONS 1-9: 0
2. FEELING DOWN, DEPRESSED OR HOPELESS: NOT AT ALL
SUM OF ALL RESPONSES TO PHQ QUESTIONS 1-9: 0
1. LITTLE INTEREST OR PLEASURE IN DOING THINGS: NOT AT ALL
SUM OF ALL RESPONSES TO PHQ QUESTIONS 1-9: 0

## 2025-01-21 LAB
TESTOST FREE SERPL-MCNC: 11.2 PG/ML (ref 7.2–24)
TESTOST SERPL-MCNC: 770 NG/DL (ref 264–916)

## 2025-01-23 ASSESSMENT — PATIENT HEALTH QUESTIONNAIRE - PHQ9
SUM OF ALL RESPONSES TO PHQ9 QUESTIONS 1 & 2: 0
1. LITTLE INTEREST OR PLEASURE IN DOING THINGS: NOT AT ALL
2. FEELING DOWN, DEPRESSED OR HOPELESS: NOT AT ALL

## 2025-01-24 SDOH — ECONOMIC STABILITY: FOOD INSECURITY: WITHIN THE PAST 12 MONTHS, YOU WORRIED THAT YOUR FOOD WOULD RUN OUT BEFORE YOU GOT MONEY TO BUY MORE.: NEVER TRUE

## 2025-01-24 SDOH — ECONOMIC STABILITY: INCOME INSECURITY: IN THE LAST 12 MONTHS, WAS THERE A TIME WHEN YOU WERE NOT ABLE TO PAY THE MORTGAGE OR RENT ON TIME?: NO

## 2025-01-24 SDOH — ECONOMIC STABILITY: FOOD INSECURITY: WITHIN THE PAST 12 MONTHS, THE FOOD YOU BOUGHT JUST DIDN'T LAST AND YOU DIDN'T HAVE MONEY TO GET MORE.: NEVER TRUE

## 2025-01-27 ENCOUNTER — OFFICE VISIT (OUTPATIENT)
Dept: FAMILY MEDICINE CLINIC | Facility: CLINIC | Age: 55
End: 2025-01-27
Payer: COMMERCIAL

## 2025-01-27 VITALS
BODY MASS INDEX: 31.82 KG/M2 | OXYGEN SATURATION: 97 % | HEART RATE: 86 BPM | SYSTOLIC BLOOD PRESSURE: 132 MMHG | DIASTOLIC BLOOD PRESSURE: 80 MMHG | WEIGHT: 186.4 LBS | HEIGHT: 64 IN

## 2025-01-27 DIAGNOSIS — E29.1 HYPOGONADISM IN MALE: ICD-10-CM

## 2025-01-27 DIAGNOSIS — Z79.890 ENCOUNTER FOR MONITORING TESTOSTERONE REPLACEMENT THERAPY: Primary | ICD-10-CM

## 2025-01-27 DIAGNOSIS — Z51.81 ENCOUNTER FOR MONITORING TESTOSTERONE REPLACEMENT THERAPY: Primary | ICD-10-CM

## 2025-01-27 DIAGNOSIS — Z12.5 SCREENING FOR PROSTATE CANCER: ICD-10-CM

## 2025-01-27 DIAGNOSIS — E78.00 HYPERCHOLESTEREMIA: ICD-10-CM

## 2025-01-27 DIAGNOSIS — N52.9 ERECTILE DYSFUNCTION, UNSPECIFIED ERECTILE DYSFUNCTION TYPE: ICD-10-CM

## 2025-01-27 PROCEDURE — 99214 OFFICE O/P EST MOD 30 MIN: CPT | Performed by: FAMILY MEDICINE

## 2025-01-27 RX ORDER — SILDENAFIL CITRATE 20 MG/1
TABLET ORAL
Qty: 5 TABLET | Refills: 0 | Status: SHIPPED | OUTPATIENT
Start: 2025-01-27

## 2025-01-27 RX ORDER — TESTOSTERONE 1.62 MG/G
2 GEL TRANSDERMAL DAILY
Qty: 75 G | Refills: 5 | Status: SHIPPED | OUTPATIENT
Start: 2025-01-27 | End: 2025-07-26

## 2025-01-27 ASSESSMENT — ENCOUNTER SYMPTOMS: RESPIRATORY NEGATIVE: 1

## 2025-01-27 NOTE — PROGRESS NOTES
PROGRESS NOTE    SUBJECTIVE:   Santiago Pandey is a 54 y.o. male seen for a follow up visit regarding   Chief Complaint   Patient presents with    Hypogonadism     Follow up        HPI:  Here for follow-up of hypogonadism.  Doing relatively well, recently had testosterone level and CBC checked, both are very good.  He endorses having some erectile dysfunction, is interested in starting Viagra.         Reviewed and updated this visit by provider:           Review of Systems   Respiratory: Negative.     Cardiovascular: Negative.           OBJECTIVE:  Vitals:    01/27/25 0845   BP: 132/80   Site: Left Upper Arm   Cuff Size: Medium Adult   Pulse: 86   SpO2: 97%   Weight: 84.6 kg (186 lb 6.4 oz)   Height: 1.638 m (5' 4.49\")        Physical Exam   General: Alert and oriented x3, well-appearing  Neck: No adenopathy, thyromegaly or thyroid nodules  Pulmonary: Normal effort, good airflow, no rales or rhonchi  CVS: Regular rate and rhythm, normal S1, S2, no S3 or S4, no murmurs; no carotid bruits, 2+ pedal pulses      Medical problems and test results were reviewed with the patient today.     Recent Results (from the past 672 hour(s))   Testosterone, free, total    Collection Time: 01/17/25  8:18 AM   Result Value Ref Range    Testosterone 770 264 - 916 ng/dL    Testosterone, Free 11.2 7.2 - 24.0 pg/mL   CBC with Auto Differential    Collection Time: 01/17/25  8:18 AM   Result Value Ref Range    WBC 5.9 4.3 - 11.1 K/uL    RBC 4.69 4.23 - 5.6 M/uL    Hemoglobin 14.3 13.6 - 17.2 g/dL    Hematocrit 44.2 41.1 - 50.3 %    MCV 94.2 82 - 102 FL    MCH 30.5 26.1 - 32.9 PG    MCHC 32.4 31.4 - 35.0 g/dL    RDW 12.4 11.9 - 14.6 %    Platelets 330 150 - 450 K/uL    MPV 9.7 9.4 - 12.3 FL    nRBC 0.00 0.0 - 0.2 K/uL    Differential Type AUTOMATED      Neutrophils % 52.9 43.0 - 78.0 %    Lymphocytes % 29.6 13.0 - 44.0 %    Monocytes % 11.4 4.0 - 12.0 %    Eosinophils % 4.9 0.5 - 7.8 %    Basophils % 0.7 0.0 - 2.0 %    Immature

## 2025-02-26 DIAGNOSIS — N52.9 ERECTILE DYSFUNCTION, UNSPECIFIED ERECTILE DYSFUNCTION TYPE: ICD-10-CM

## 2025-02-26 DIAGNOSIS — E29.1 HYPOGONADISM IN MALE: ICD-10-CM

## 2025-02-26 RX ORDER — SILDENAFIL CITRATE 20 MG/1
TABLET ORAL
Qty: 50 TABLET | Refills: 5 | Status: SHIPPED | OUTPATIENT
Start: 2025-02-26

## 2025-02-26 RX ORDER — TESTOSTERONE 1.62 MG/G
2 GEL TRANSDERMAL DAILY
Qty: 75 G | Refills: 5 | Status: SHIPPED | OUTPATIENT
Start: 2025-02-26 | End: 2025-08-25

## 2025-05-02 ENCOUNTER — APPOINTMENT (OUTPATIENT)
Dept: URBAN - METROPOLITAN AREA CLINIC 330 | Facility: CLINIC | Age: 55
Setting detail: DERMATOLOGY
End: 2025-05-02

## 2025-05-02 DIAGNOSIS — L57.0 ACTINIC KERATOSIS: ICD-10-CM | Status: INADEQUATELY CONTROLLED

## 2025-05-02 DIAGNOSIS — L82.1 OTHER SEBORRHEIC KERATOSIS: ICD-10-CM | Status: UNCHANGED

## 2025-05-02 DIAGNOSIS — L57.8 OTHER SKIN CHANGES DUE TO CHRONIC EXPOSURE TO NONIONIZING RADIATION: ICD-10-CM | Status: STABLE

## 2025-05-02 DIAGNOSIS — L30.9 DERMATITIS, UNSPECIFIED: ICD-10-CM | Status: INADEQUATELY CONTROLLED

## 2025-05-02 DIAGNOSIS — D18.0 HEMANGIOMA: ICD-10-CM | Status: STABLE

## 2025-05-02 DIAGNOSIS — D22 MELANOCYTIC NEVI: ICD-10-CM | Status: UNCHANGED

## 2025-05-02 PROBLEM — D22.5 MELANOCYTIC NEVI OF TRUNK: Status: ACTIVE | Noted: 2025-05-02

## 2025-05-02 PROBLEM — D18.01 HEMANGIOMA OF SKIN AND SUBCUTANEOUS TISSUE: Status: ACTIVE | Noted: 2025-05-02

## 2025-05-02 PROCEDURE — ? PRESCRIPTION

## 2025-05-02 PROCEDURE — ? LIQUID NITROGEN

## 2025-05-02 PROCEDURE — 17000 DESTRUCT PREMALG LESION: CPT

## 2025-05-02 PROCEDURE — 99204 OFFICE O/P NEW MOD 45 MIN: CPT | Mod: 25

## 2025-05-02 PROCEDURE — ? FULL BODY SKIN EXAM

## 2025-05-02 PROCEDURE — ? COUNSELING: TOPICAL STEROIDS

## 2025-05-02 PROCEDURE — ? SUNSCREEN RECOMMENDATIONS

## 2025-05-02 PROCEDURE — 17003 DESTRUCT PREMALG LES 2-14: CPT

## 2025-05-02 PROCEDURE — ? COUNSELING

## 2025-05-02 PROCEDURE — ? PRESCRIPTION MEDICATION MANAGEMENT

## 2025-05-02 RX ORDER — TRIAMCINOLONE ACETONIDE 1 MG/G
CREAM TOPICAL
Qty: 453.6 | Refills: 1 | Status: ERX | COMMUNITY
Start: 2025-05-02

## 2025-05-02 RX ADMIN — TRIAMCINOLONE ACETONIDE: 1 CREAM TOPICAL at 00:00

## 2025-05-02 ASSESSMENT — LOCATION SIMPLE DESCRIPTION DERM
LOCATION SIMPLE: LEFT TEMPLE
LOCATION SIMPLE: LEFT FOREARM
LOCATION SIMPLE: RIGHT FOREARM
LOCATION SIMPLE: LEFT FOREHEAD
LOCATION SIMPLE: LEFT CHEEK
LOCATION SIMPLE: NOSE
LOCATION SIMPLE: SUPERIOR FOREHEAD
LOCATION SIMPLE: RIGHT UPPER BACK
LOCATION SIMPLE: CHEST
LOCATION SIMPLE: LEFT THIGH
LOCATION SIMPLE: LEFT UPPER BACK
LOCATION SIMPLE: RIGHT THIGH
LOCATION SIMPLE: RIGHT TEMPLE

## 2025-05-02 ASSESSMENT — LOCATION DETAILED DESCRIPTION DERM
LOCATION DETAILED: STERNUM
LOCATION DETAILED: RIGHT SUPERIOR MEDIAL UPPER BACK
LOCATION DETAILED: LEFT ANTERIOR PROXIMAL THIGH
LOCATION DETAILED: LEFT PROXIMAL DORSAL FOREARM
LOCATION DETAILED: SUPERIOR MID FOREHEAD
LOCATION DETAILED: RIGHT PROXIMAL DORSAL FOREARM
LOCATION DETAILED: LEFT INFERIOR LATERAL FOREHEAD
LOCATION DETAILED: RIGHT MID TEMPLE
LOCATION DETAILED: RIGHT ANTERIOR PROXIMAL THIGH
LOCATION DETAILED: LEFT MID TEMPLE
LOCATION DETAILED: LEFT MEDIAL UPPER BACK
LOCATION DETAILED: NASAL DORSUM
LOCATION DETAILED: LEFT MEDIAL SUPERIOR CHEST
LOCATION DETAILED: LEFT SUPERIOR LATERAL MALAR CHEEK

## 2025-05-02 ASSESSMENT — LOCATION ZONE DERM
LOCATION ZONE: FACE
LOCATION ZONE: ARM
LOCATION ZONE: TRUNK
LOCATION ZONE: NOSE
LOCATION ZONE: LEG

## 2025-05-02 NOTE — HPI: EVALUATION OF SKIN LESION(S)
What Type Of Note Output Would You Prefer (Optional)?: Bullet Format
Hpi Title: Evaluation of Skin Lesions
Year Removed: 1900
Additional History: Patient is here for FBE. He denies any history of skin cancer. He notes a few spots of concern present today and an itchy rash on his leg that seems to come and go.

## 2025-05-02 NOTE — PROCEDURE: PRESCRIPTION MEDICATION MANAGEMENT
Render In Strict Bullet Format?: No
Detail Level: Zone
Initiate Treatment: triamcinolone acetonide 0.1 % topical cream  Apply to affected area BID for two weeks then 2 days a week as needed.

## 2025-08-04 SDOH — HEALTH STABILITY: PHYSICAL HEALTH: ON AVERAGE, HOW MANY MINUTES DO YOU ENGAGE IN EXERCISE AT THIS LEVEL?: 20 MIN

## 2025-08-04 SDOH — HEALTH STABILITY: PHYSICAL HEALTH: ON AVERAGE, HOW MANY DAYS PER WEEK DO YOU ENGAGE IN MODERATE TO STRENUOUS EXERCISE (LIKE A BRISK WALK)?: 3 DAYS

## 2025-08-05 SDOH — HEALTH STABILITY: PHYSICAL HEALTH: ON AVERAGE, HOW MANY MINUTES DO YOU ENGAGE IN EXERCISE AT THIS LEVEL?: 20 MIN

## 2025-08-05 SDOH — HEALTH STABILITY: PHYSICAL HEALTH: ON AVERAGE, HOW MANY DAYS PER WEEK DO YOU ENGAGE IN MODERATE TO STRENUOUS EXERCISE (LIKE A BRISK WALK)?: 3 DAYS

## 2025-08-07 ENCOUNTER — OFFICE VISIT (OUTPATIENT)
Dept: FAMILY MEDICINE CLINIC | Facility: CLINIC | Age: 55
End: 2025-08-07
Payer: COMMERCIAL

## 2025-08-07 VITALS
OXYGEN SATURATION: 96 % | HEART RATE: 88 BPM | DIASTOLIC BLOOD PRESSURE: 88 MMHG | WEIGHT: 187.1 LBS | TEMPERATURE: 98.2 F | SYSTOLIC BLOOD PRESSURE: 128 MMHG | HEIGHT: 64 IN | BODY MASS INDEX: 31.94 KG/M2

## 2025-08-07 DIAGNOSIS — Z76.89 ENCOUNTER TO ESTABLISH CARE WITH NEW DOCTOR: ICD-10-CM

## 2025-08-07 DIAGNOSIS — N52.9 ERECTILE DYSFUNCTION, UNSPECIFIED ERECTILE DYSFUNCTION TYPE: ICD-10-CM

## 2025-08-07 DIAGNOSIS — R73.9 HYPERGLYCEMIA: ICD-10-CM

## 2025-08-07 DIAGNOSIS — R97.20 ELEVATED PSA: ICD-10-CM

## 2025-08-07 DIAGNOSIS — E29.1 HYPOGONADISM IN MALE: ICD-10-CM

## 2025-08-07 DIAGNOSIS — E78.00 HYPERCHOLESTEREMIA: ICD-10-CM

## 2025-08-07 DIAGNOSIS — N52.9 ERECTILE DYSFUNCTION, UNSPECIFIED ERECTILE DYSFUNCTION TYPE: Primary | ICD-10-CM

## 2025-08-07 LAB
ALBUMIN SERPL-MCNC: 3.9 G/DL (ref 3.5–5)
ALBUMIN/GLOB SERPL: 1.1 (ref 1–1.9)
ALP SERPL-CCNC: 92 U/L (ref 40–129)
ALT SERPL-CCNC: 32 U/L (ref 8–55)
ANION GAP SERPL CALC-SCNC: 13 MMOL/L (ref 7–16)
AST SERPL-CCNC: 23 U/L (ref 15–37)
BASOPHILS # BLD: 0.05 K/UL (ref 0–0.2)
BASOPHILS NFR BLD: 0.7 % (ref 0–2)
BILIRUB SERPL-MCNC: 0.4 MG/DL (ref 0–1.2)
BUN SERPL-MCNC: 12 MG/DL (ref 6–23)
CALCIUM SERPL-MCNC: 10 MG/DL (ref 8.8–10.2)
CHLORIDE SERPL-SCNC: 101 MMOL/L (ref 98–107)
CHOLEST SERPL-MCNC: 189 MG/DL (ref 0–200)
CO2 SERPL-SCNC: 24 MMOL/L (ref 20–29)
CREAT SERPL-MCNC: 0.89 MG/DL (ref 0.8–1.3)
CREAT UR-MCNC: 15.9 MG/DL (ref 39–259)
DIFFERENTIAL METHOD BLD: NORMAL
EOSINOPHIL # BLD: 0.06 K/UL (ref 0–0.8)
EOSINOPHIL NFR BLD: 0.9 % (ref 0.5–7.8)
ERYTHROCYTE [DISTWIDTH] IN BLOOD BY AUTOMATED COUNT: 12.9 % (ref 11.9–14.6)
EST. AVERAGE GLUCOSE BLD GHB EST-MCNC: 109 MG/DL
GLOBULIN SER CALC-MCNC: 3.4 G/DL (ref 2.3–3.5)
GLUCOSE SERPL-MCNC: 102 MG/DL (ref 70–99)
HBA1C MFR BLD: 5.4 % (ref 0–5.6)
HCT VFR BLD AUTO: 46.3 % (ref 41.1–50.3)
HDLC SERPL-MCNC: 63 MG/DL (ref 40–60)
HDLC SERPL: 3 (ref 0–5)
HGB BLD-MCNC: 15.1 G/DL (ref 13.6–17.2)
IMM GRANULOCYTES # BLD AUTO: 0.03 K/UL (ref 0–0.5)
IMM GRANULOCYTES NFR BLD AUTO: 0.4 % (ref 0–5)
LDLC SERPL CALC-MCNC: 110 MG/DL (ref 0–100)
LYMPHOCYTES # BLD: 1.83 K/UL (ref 0.5–4.6)
LYMPHOCYTES NFR BLD: 26.2 % (ref 13–44)
MCH RBC QN AUTO: 30.7 PG (ref 26.1–32.9)
MCHC RBC AUTO-ENTMCNC: 32.6 G/DL (ref 31.4–35)
MCV RBC AUTO: 94.1 FL (ref 82–102)
MICROALBUMIN UR-MCNC: <1.2 MG/DL (ref 0–20)
MICROALBUMIN/CREAT UR-RTO: ABNORMAL MG/G (ref 0–30)
MONOCYTES # BLD: 0.7 K/UL (ref 0.1–1.3)
MONOCYTES NFR BLD: 10 % (ref 4–12)
NEUTS SEG # BLD: 4.31 K/UL (ref 1.7–8.2)
NEUTS SEG NFR BLD: 61.8 % (ref 43–78)
NRBC # BLD: 0 K/UL (ref 0–0.2)
PLATELET # BLD AUTO: 328 K/UL (ref 150–450)
PMV BLD AUTO: 9.7 FL (ref 9.4–12.3)
POTASSIUM SERPL-SCNC: 4.7 MMOL/L (ref 3.5–5.1)
PROT SERPL-MCNC: 7.4 G/DL (ref 6.3–8.2)
PSA FREE MFR SERPL: 29.1 %
PSA FREE SERPL-MCNC: 0.9 NG/ML
PSA SERPL-MCNC: 3.2 NG/ML (ref 0–4)
RBC # BLD AUTO: 4.92 M/UL (ref 4.23–5.6)
SODIUM SERPL-SCNC: 139 MMOL/L (ref 136–145)
TRIGL SERPL-MCNC: 82 MG/DL (ref 0–150)
TSH W FREE THYROID IF ABNORMAL: 2.06 UIU/ML (ref 0.27–4.2)
VLDLC SERPL CALC-MCNC: 16 MG/DL (ref 6–23)
WBC # BLD AUTO: 7 K/UL (ref 4.3–11.1)

## 2025-08-07 PROCEDURE — 99214 OFFICE O/P EST MOD 30 MIN: CPT | Performed by: STUDENT IN AN ORGANIZED HEALTH CARE EDUCATION/TRAINING PROGRAM

## 2025-08-07 RX ORDER — TESTOSTERONE 1.62 MG/G
2 GEL TRANSDERMAL DAILY
Qty: 75 G | Refills: 5 | Status: SHIPPED | OUTPATIENT
Start: 2025-08-07 | End: 2026-02-03

## 2025-08-07 ASSESSMENT — ENCOUNTER SYMPTOMS
BLOOD IN STOOL: 0
EYE DISCHARGE: 0
SORE THROAT: 0
DIARRHEA: 0
NAUSEA: 0
CHEST TIGHTNESS: 0
ABDOMINAL PAIN: 0
WHEEZING: 0
SHORTNESS OF BREATH: 0
SINUS PAIN: 0
SINUS PRESSURE: 0
VOMITING: 0

## 2025-08-08 LAB — TESTOST SERPL-MCNC: 602 NG/DL (ref 264–916)

## 2025-08-12 LAB
TESTOST FREE SERPL-MCNC: 10.4 PG/ML (ref 7.2–24)
TESTOST SERPL-MCNC: 602 NG/DL (ref 264–916)

## 2025-09-02 ENCOUNTER — TELEMEDICINE (OUTPATIENT)
Dept: FAMILY MEDICINE CLINIC | Facility: CLINIC | Age: 55
End: 2025-09-02
Payer: COMMERCIAL

## 2025-09-02 DIAGNOSIS — E29.1 HYPOGONADISM IN MALE: Primary | ICD-10-CM

## 2025-09-02 DIAGNOSIS — E78.00 HYPERCHOLESTEREMIA: ICD-10-CM

## 2025-09-02 DIAGNOSIS — R73.9 HYPERGLYCEMIA: ICD-10-CM

## 2025-09-02 DIAGNOSIS — N52.9 ERECTILE DYSFUNCTION, UNSPECIFIED ERECTILE DYSFUNCTION TYPE: ICD-10-CM

## 2025-09-02 PROCEDURE — 99213 OFFICE O/P EST LOW 20 MIN: CPT | Performed by: STUDENT IN AN ORGANIZED HEALTH CARE EDUCATION/TRAINING PROGRAM

## 2025-09-02 ASSESSMENT — ENCOUNTER SYMPTOMS
SHORTNESS OF BREATH: 0
SINUS PRESSURE: 0
DIARRHEA: 0
SINUS PAIN: 0
CHEST TIGHTNESS: 0
EYE DISCHARGE: 0
SORE THROAT: 0
NAUSEA: 0
BLOOD IN STOOL: 0
WHEEZING: 0
ABDOMINAL PAIN: 0
VOMITING: 0

## (undated) DEVICE — 3M™ STERI-DRAPE™ INSTRUMENT POUCH 1018: Brand: STERI-DRAPE™

## (undated) DEVICE — GLOVE SURG SZ 8 CRM LTX FREE POLYISOPRENE POLYMER BEAD ANTI

## (undated) DEVICE — 3M™ IOBAN™ 2 ANTIMICROBIAL INCISE DRAPE 6651EZ: Brand: IOBAN™ 2

## (undated) DEVICE — CANNULA NSL ORAL AD FOR CAPNOFLEX CO2 O2 AIRLFE

## (undated) DEVICE — SOLUTION IRRIG 3000ML 0.9% SOD CHL FLX CONT 0797208] ICU MEDICAL INC]

## (undated) DEVICE — SYSTEM SKIN CLSR 22CM DERMBND PRINEO

## (undated) DEVICE — 450 ML BOTTLE OF 0.05% CHLORHEXIDINE GLUCONATE IN 99.95% STERILE WATER FOR IRRIGATION, USP AND APPLICATOR.: Brand: IRRISEPT ANTIMICROBIAL WOUND LAVAGE

## (undated) DEVICE — T4 HOOD

## (undated) DEVICE — SUTURE PROL SZ 2-0 L30IN NONABSORBABLE BLU L26MM CT-2 1/2 8411H

## (undated) DEVICE — PAD,NON-ADHERENT,3X8,STERILE,LF,1/PK: Brand: MEDLINE

## (undated) DEVICE — SUTURE FIBERWIRE SZ 2 W/ TAPERED NEEDLE BLUE L38IN NONABSORB BLU L26.5MM 1/2 CIRCLE AR7200

## (undated) DEVICE — STRIP,CLOSURE,WOUND,MEDI-STRIP,1/2X4: Brand: MEDLINE

## (undated) DEVICE — JELLY LUBRICATING 10GM PREFIL SYR LUBE

## (undated) DEVICE — NEEDLE HYPO 21GA L1.5IN INTRAMUSCULAR S STL LATCH BVL UP

## (undated) DEVICE — TUBING, SUCTION, 3/16" X 10', STRAIGHT: Brand: MEDLINE

## (undated) DEVICE — SPONGE: SPECIALTY TONSIL XR MED 100/CS: Brand: MEDICAL ACTION INDUSTRIES

## (undated) DEVICE — TOTAL HIP DR WATSON: Brand: MEDLINE INDUSTRIES, INC.

## (undated) DEVICE — KENDALL RADIOLUCENT FOAM MONITORING ELECTRODE RECTANGULAR SHAPE: Brand: KENDALL

## (undated) DEVICE — DRAPE,U/SHT,SPLIT,FILM,60X84,STERILE: Brand: MEDLINE

## (undated) DEVICE — SHEET, T, LAPAROTOMY, STERILE: Brand: MEDLINE

## (undated) DEVICE — SOLUTION IRRIG 1000ML 09% SOD CHL USP PIC PLAS CONTAINER

## (undated) DEVICE — BIPOLAR SEALER 23-112-1 AQM 6.0: Brand: AQUAMANTYS ®

## (undated) DEVICE — MASTISOL ADHESIVE LIQ 2/3ML

## (undated) DEVICE — SUTURE VCRL SZ 4-0 L18IN ABSRB UD L19MM PS-2 3/8 CIR PRIM J496H

## (undated) DEVICE — CONNECTOR TBNG OD5-7MM O2 END DISP

## (undated) DEVICE — SUTURE VCRL SZ 3-0 L27IN ABSRB UD L26MM CT-2 1/2 CIR J232H

## (undated) DEVICE — SOLUTION IV 250ML 0.9% SOD CHL CLR INJ FLX BG CONT PRT CLSR

## (undated) DEVICE — ABDOMINAL PAD: Brand: DERMACEA

## (undated) DEVICE — SUTURE STRATAFIX SPRL SZ 1 L5IN ABSRB VLT CT-1 L36MM 1/2 SXPD2B401

## (undated) DEVICE — SOLUTION IV 1000ML 0.9% SOD CHL

## (undated) DEVICE — STRYKER PERFORMANCE SERIES SAGITTAL BLADE: Brand: STRYKER PERFORMANCE SERIES

## (undated) DEVICE — REM POLYHESIVE ADULT PATIENT RETURN ELECTRODE: Brand: VALLEYLAB

## (undated) DEVICE — AIRLIFE™ OXYGEN TUBING 7 FEET (2.1 M) CRUSH RESISTANT OXYGEN TUBING, VINYL TIPPED: Brand: AIRLIFE™

## (undated) DEVICE — 3M™ TEGADERM™ TRANSPARENT FILM DRESSING FRAME STYLE, 1626W, 4 IN X 4-3/4 IN (10 CM X 12 CM), 50/CT 4CT/CASE: Brand: 3M™ TEGADERM™

## (undated) DEVICE — INTENDED FOR TISSUE SEPARATION, AND OTHER PROCEDURES THAT REQUIRE A SHARP SURGICAL BLADE TO PUNCTURE OR CUT.: Brand: BARD-PARKER ® STAINLESS STEEL BLADES

## (undated) DEVICE — SUTURE MCRYL SZ 2-0 L27IN ABSRB UD CP-1 1 L36MM 1/2 CIR REV Y266H

## (undated) DEVICE — SUTURE STRATAFIX SZ 3-0 L30CM NONABSORBABLE UD L19MM FS-2 SXMP2B408

## (undated) DEVICE — GOWN,AURORA,FABRIC-REINFORCED,2XL: Brand: MEDLINE

## (undated) DEVICE — STOCKINETTE TUBE 6X48 -- MEDICHOICE

## (undated) DEVICE — TRAY PREP DRY W/ PREM GLV 2 APPL 6 SPNG 2 UNDPD 1 OVERWRAP

## (undated) DEVICE — SYR 10ML LUER LOK 1/5ML GRAD --

## (undated) DEVICE — MINOR SPLIT GENERAL: Brand: MEDLINE INDUSTRIES, INC.

## (undated) DEVICE — HANDPIECE SET WITH COAXIAL HIGH FLOW TIP AND SUCTION TUBE: Brand: INTERPULSE

## (undated) DEVICE — (D)PREP SKN CHLRAPRP APPL 26ML -- CONVERT TO ITEM 371833

## (undated) DEVICE — PIN FIX TROCAR PT 1 END 9/64X9 IN 1 PT STYL SMOOTH PLN STRL
Type: IMPLANTABLE DEVICE | Site: HIP | Status: NON-FUNCTIONAL
Removed: 2022-01-05